# Patient Record
Sex: MALE | Race: BLACK OR AFRICAN AMERICAN | NOT HISPANIC OR LATINO | Employment: FULL TIME | URBAN - METROPOLITAN AREA
[De-identification: names, ages, dates, MRNs, and addresses within clinical notes are randomized per-mention and may not be internally consistent; named-entity substitution may affect disease eponyms.]

---

## 2017-09-25 ENCOUNTER — TELEPHONE (OUTPATIENT)
Dept: PRIMARY CARE CLINIC | Facility: CLINIC | Age: 52
End: 2017-09-25

## 2017-09-25 NOTE — TELEPHONE ENCOUNTER
----- Message from Shahab Hu sent at 9/22/2017 12:10 PM CDT -----  Contact: Patient  Patient states that he had called previously and left a message.  He was at the ER yesterday and has a bad Ulcer on the Right Side of his Thigh.  He made the appointment for Monday, 09/25/2017.  He is now outside the building but it was explained that it is no longer a walk in.  Please call him back at [phone].  Thank you

## 2021-10-27 ENCOUNTER — TELEPHONE (OUTPATIENT)
Dept: PRIMARY CARE CLINIC | Facility: CLINIC | Age: 56
End: 2021-10-27
Payer: COMMERCIAL

## 2021-11-09 ENCOUNTER — OFFICE VISIT (OUTPATIENT)
Dept: PRIMARY CARE CLINIC | Facility: CLINIC | Age: 56
End: 2021-11-09
Payer: COMMERCIAL

## 2021-11-09 VITALS
DIASTOLIC BLOOD PRESSURE: 86 MMHG | HEART RATE: 94 BPM | RESPIRATION RATE: 18 BRPM | BODY MASS INDEX: 40.71 KG/M2 | TEMPERATURE: 98 F | WEIGHT: 253.31 LBS | OXYGEN SATURATION: 99 % | HEIGHT: 66 IN | SYSTOLIC BLOOD PRESSURE: 136 MMHG

## 2021-11-09 DIAGNOSIS — E66.01 OBESITY, MORBID, BMI 40.0-49.9: ICD-10-CM

## 2021-11-09 DIAGNOSIS — Z76.89 ENCOUNTER TO ESTABLISH CARE: Primary | ICD-10-CM

## 2021-11-09 DIAGNOSIS — R60.0 LOWER EXTREMITY EDEMA: ICD-10-CM

## 2021-11-09 DIAGNOSIS — Z12.11 COLON CANCER SCREENING: ICD-10-CM

## 2021-11-09 DIAGNOSIS — Z00.00 HEALTH CARE MAINTENANCE: ICD-10-CM

## 2021-11-09 DIAGNOSIS — Z11.59 NEED FOR HEPATITIS C SCREENING TEST: ICD-10-CM

## 2021-11-09 DIAGNOSIS — Z23 NEED FOR VACCINATION: ICD-10-CM

## 2021-11-09 DIAGNOSIS — Z11.4 ENCOUNTER FOR SCREENING FOR HIV: ICD-10-CM

## 2021-11-09 PROCEDURE — 90714 TD VACCINE GREATER THAN OR EQUAL TO 7YO PRESERVATIVE FREE IM: ICD-10-PCS | Mod: S$GLB,,, | Performed by: STUDENT IN AN ORGANIZED HEALTH CARE EDUCATION/TRAINING PROGRAM

## 2021-11-09 PROCEDURE — 93010 ELECTROCARDIOGRAM REPORT: CPT | Mod: S$GLB,,, | Performed by: INTERNAL MEDICINE

## 2021-11-09 PROCEDURE — 1159F MED LIST DOCD IN RCRD: CPT | Mod: CPTII,S$GLB,, | Performed by: STUDENT IN AN ORGANIZED HEALTH CARE EDUCATION/TRAINING PROGRAM

## 2021-11-09 PROCEDURE — 93005 ELECTROCARDIOGRAM TRACING: CPT | Mod: S$GLB,,, | Performed by: STUDENT IN AN ORGANIZED HEALTH CARE EDUCATION/TRAINING PROGRAM

## 2021-11-09 PROCEDURE — 90714 TD VACC NO PRESV 7 YRS+ IM: CPT | Mod: S$GLB,,, | Performed by: STUDENT IN AN ORGANIZED HEALTH CARE EDUCATION/TRAINING PROGRAM

## 2021-11-09 PROCEDURE — 99204 OFFICE O/P NEW MOD 45 MIN: CPT | Mod: 25,S$GLB,, | Performed by: STUDENT IN AN ORGANIZED HEALTH CARE EDUCATION/TRAINING PROGRAM

## 2021-11-09 PROCEDURE — 3008F PR BODY MASS INDEX (BMI) DOCUMENTED: ICD-10-PCS | Mod: CPTII,S$GLB,, | Performed by: STUDENT IN AN ORGANIZED HEALTH CARE EDUCATION/TRAINING PROGRAM

## 2021-11-09 PROCEDURE — 3079F PR MOST RECENT DIASTOLIC BLOOD PRESSURE 80-89 MM HG: ICD-10-PCS | Mod: CPTII,S$GLB,, | Performed by: STUDENT IN AN ORGANIZED HEALTH CARE EDUCATION/TRAINING PROGRAM

## 2021-11-09 PROCEDURE — 1160F RVW MEDS BY RX/DR IN RCRD: CPT | Mod: CPTII,S$GLB,, | Performed by: STUDENT IN AN ORGANIZED HEALTH CARE EDUCATION/TRAINING PROGRAM

## 2021-11-09 PROCEDURE — 90471 TD VACCINE GREATER THAN OR EQUAL TO 7YO PRESERVATIVE FREE IM: ICD-10-PCS | Mod: S$GLB,,, | Performed by: STUDENT IN AN ORGANIZED HEALTH CARE EDUCATION/TRAINING PROGRAM

## 2021-11-09 PROCEDURE — 99204 PR OFFICE/OUTPT VISIT, NEW, LEVL IV, 45-59 MIN: ICD-10-PCS | Mod: 25,S$GLB,, | Performed by: STUDENT IN AN ORGANIZED HEALTH CARE EDUCATION/TRAINING PROGRAM

## 2021-11-09 PROCEDURE — 3075F SYST BP GE 130 - 139MM HG: CPT | Mod: CPTII,S$GLB,, | Performed by: STUDENT IN AN ORGANIZED HEALTH CARE EDUCATION/TRAINING PROGRAM

## 2021-11-09 PROCEDURE — 99999 PR PBB SHADOW E&M-EST. PATIENT-LVL III: ICD-10-PCS | Mod: PBBFAC,,, | Performed by: STUDENT IN AN ORGANIZED HEALTH CARE EDUCATION/TRAINING PROGRAM

## 2021-11-09 PROCEDURE — 3075F PR MOST RECENT SYSTOLIC BLOOD PRESS GE 130-139MM HG: ICD-10-PCS | Mod: CPTII,S$GLB,, | Performed by: STUDENT IN AN ORGANIZED HEALTH CARE EDUCATION/TRAINING PROGRAM

## 2021-11-09 PROCEDURE — 1160F PR REVIEW ALL MEDS BY PRESCRIBER/CLIN PHARMACIST DOCUMENTED: ICD-10-PCS | Mod: CPTII,S$GLB,, | Performed by: STUDENT IN AN ORGANIZED HEALTH CARE EDUCATION/TRAINING PROGRAM

## 2021-11-09 PROCEDURE — 99999 PR PBB SHADOW E&M-EST. PATIENT-LVL III: CPT | Mod: PBBFAC,,, | Performed by: STUDENT IN AN ORGANIZED HEALTH CARE EDUCATION/TRAINING PROGRAM

## 2021-11-09 PROCEDURE — 93005 EKG 12-LEAD: ICD-10-PCS | Mod: S$GLB,,, | Performed by: STUDENT IN AN ORGANIZED HEALTH CARE EDUCATION/TRAINING PROGRAM

## 2021-11-09 PROCEDURE — 93010 EKG 12-LEAD: ICD-10-PCS | Mod: S$GLB,,, | Performed by: INTERNAL MEDICINE

## 2021-11-09 PROCEDURE — 1159F PR MEDICATION LIST DOCUMENTED IN MEDICAL RECORD: ICD-10-PCS | Mod: CPTII,S$GLB,, | Performed by: STUDENT IN AN ORGANIZED HEALTH CARE EDUCATION/TRAINING PROGRAM

## 2021-11-09 PROCEDURE — 3079F DIAST BP 80-89 MM HG: CPT | Mod: CPTII,S$GLB,, | Performed by: STUDENT IN AN ORGANIZED HEALTH CARE EDUCATION/TRAINING PROGRAM

## 2021-11-09 PROCEDURE — 3008F BODY MASS INDEX DOCD: CPT | Mod: CPTII,S$GLB,, | Performed by: STUDENT IN AN ORGANIZED HEALTH CARE EDUCATION/TRAINING PROGRAM

## 2021-11-09 PROCEDURE — 90471 IMMUNIZATION ADMIN: CPT | Mod: S$GLB,,, | Performed by: STUDENT IN AN ORGANIZED HEALTH CARE EDUCATION/TRAINING PROGRAM

## 2021-12-03 LAB — NONINV COLON CA DNA+OCC BLD SCRN STL QL: NEGATIVE

## 2022-01-10 ENCOUNTER — OFFICE VISIT (OUTPATIENT)
Dept: PRIMARY CARE CLINIC | Facility: CLINIC | Age: 57
End: 2022-01-10
Payer: COMMERCIAL

## 2022-01-10 VITALS
BODY MASS INDEX: 42.07 KG/M2 | WEIGHT: 261.81 LBS | RESPIRATION RATE: 16 BRPM | SYSTOLIC BLOOD PRESSURE: 118 MMHG | HEART RATE: 81 BPM | DIASTOLIC BLOOD PRESSURE: 70 MMHG | OXYGEN SATURATION: 99 % | HEIGHT: 66 IN | TEMPERATURE: 98 F

## 2022-01-10 DIAGNOSIS — Z09 FOLLOW-UP EXAM: Primary | ICD-10-CM

## 2022-01-10 DIAGNOSIS — E78.5 HYPERLIPIDEMIA, UNSPECIFIED HYPERLIPIDEMIA TYPE: ICD-10-CM

## 2022-01-10 PROCEDURE — 3074F SYST BP LT 130 MM HG: CPT | Mod: CPTII,S$GLB,, | Performed by: STUDENT IN AN ORGANIZED HEALTH CARE EDUCATION/TRAINING PROGRAM

## 2022-01-10 PROCEDURE — 1159F PR MEDICATION LIST DOCUMENTED IN MEDICAL RECORD: ICD-10-PCS | Mod: CPTII,S$GLB,, | Performed by: STUDENT IN AN ORGANIZED HEALTH CARE EDUCATION/TRAINING PROGRAM

## 2022-01-10 PROCEDURE — 99214 PR OFFICE/OUTPT VISIT, EST, LEVL IV, 30-39 MIN: ICD-10-PCS | Mod: S$GLB,,, | Performed by: STUDENT IN AN ORGANIZED HEALTH CARE EDUCATION/TRAINING PROGRAM

## 2022-01-10 PROCEDURE — 3078F PR MOST RECENT DIASTOLIC BLOOD PRESSURE < 80 MM HG: ICD-10-PCS | Mod: CPTII,S$GLB,, | Performed by: STUDENT IN AN ORGANIZED HEALTH CARE EDUCATION/TRAINING PROGRAM

## 2022-01-10 PROCEDURE — 3008F PR BODY MASS INDEX (BMI) DOCUMENTED: ICD-10-PCS | Mod: CPTII,S$GLB,, | Performed by: STUDENT IN AN ORGANIZED HEALTH CARE EDUCATION/TRAINING PROGRAM

## 2022-01-10 PROCEDURE — 3008F BODY MASS INDEX DOCD: CPT | Mod: CPTII,S$GLB,, | Performed by: STUDENT IN AN ORGANIZED HEALTH CARE EDUCATION/TRAINING PROGRAM

## 2022-01-10 PROCEDURE — 99214 OFFICE O/P EST MOD 30 MIN: CPT | Mod: S$GLB,,, | Performed by: STUDENT IN AN ORGANIZED HEALTH CARE EDUCATION/TRAINING PROGRAM

## 2022-01-10 PROCEDURE — 3078F DIAST BP <80 MM HG: CPT | Mod: CPTII,S$GLB,, | Performed by: STUDENT IN AN ORGANIZED HEALTH CARE EDUCATION/TRAINING PROGRAM

## 2022-01-10 PROCEDURE — 99999 PR PBB SHADOW E&M-EST. PATIENT-LVL III: CPT | Mod: PBBFAC,,, | Performed by: STUDENT IN AN ORGANIZED HEALTH CARE EDUCATION/TRAINING PROGRAM

## 2022-01-10 PROCEDURE — 1160F RVW MEDS BY RX/DR IN RCRD: CPT | Mod: CPTII,S$GLB,, | Performed by: STUDENT IN AN ORGANIZED HEALTH CARE EDUCATION/TRAINING PROGRAM

## 2022-01-10 PROCEDURE — 1160F PR REVIEW ALL MEDS BY PRESCRIBER/CLIN PHARMACIST DOCUMENTED: ICD-10-PCS | Mod: CPTII,S$GLB,, | Performed by: STUDENT IN AN ORGANIZED HEALTH CARE EDUCATION/TRAINING PROGRAM

## 2022-01-10 PROCEDURE — 99999 PR PBB SHADOW E&M-EST. PATIENT-LVL III: ICD-10-PCS | Mod: PBBFAC,,, | Performed by: STUDENT IN AN ORGANIZED HEALTH CARE EDUCATION/TRAINING PROGRAM

## 2022-01-10 PROCEDURE — 1159F MED LIST DOCD IN RCRD: CPT | Mod: CPTII,S$GLB,, | Performed by: STUDENT IN AN ORGANIZED HEALTH CARE EDUCATION/TRAINING PROGRAM

## 2022-01-10 PROCEDURE — 3074F PR MOST RECENT SYSTOLIC BLOOD PRESSURE < 130 MM HG: ICD-10-PCS | Mod: CPTII,S$GLB,, | Performed by: STUDENT IN AN ORGANIZED HEALTH CARE EDUCATION/TRAINING PROGRAM

## 2022-01-10 RX ORDER — IBUPROFEN 800 MG/1
800 TABLET ORAL EVERY 6 HOURS PRN
COMMUNITY
End: 2023-07-11

## 2022-01-10 NOTE — PATIENT INSTRUCTIONS
"    Follow-up blood pressure:   - blood pressure normalized since adjusting diet.   - continue with the new diet.  Limit alcohol intake.  He lots of green vegetables, limit salt and avoid fried foods.    Elevated cholesterol:   - LDLs were slightly elevated in the 150s on last check, goal would be under 130.   - would recommend similar diet to that noted above with lots of vegetables, low fried foods.      Leg Swelling:   - bilateral.   - can use compression socks.  Elevation.  Regular exercise.   - will benefit from weight loss taking pressure off your circulatory system.    Elevated weight:   - encouraged to continue working on weight loss.   - your dietary changes have been encouraging.    Weight Loss:   - Body mass index is 42.26 kg/m².   - Normal weight is BMI 18-25, Overweight 25-30, and Obesity is 30+.   - would recommend weight loss for improved overall health.   - recommended moderate weight change, 1-2lbs per weeks.   - focus on eating a healthy sustainable diet.  Use food diary.   - consider jackie such as "Lose It" or "Noom".   - avoid empty calories that you may use daily from items such as like soda, sweet tea, sugary coffee, ice cream or candy.  An occasional piece of birthday cake is not the cause of obesity, but a daily Frappaccino could be to blame.    - Exercise has many benefits (heart health, improved mood/energy, higher self esteem, less depression, greater strength/flexibility, better sleep, less stress/anxiety, improved immune system, stronger bones, improved cognition, fewer colds/asthma exacerbations), it also does help lose weight.  But weight loss from exercise is much less impactful than when a change in diet can achieve.  Exercise is highly encouraged, but diet change should be the primary tool used to lose weight.     "

## 2022-01-10 NOTE — PROGRESS NOTES
Subjective:           Patient ID: Pravin Acevedo is a 56 y.o. male who presents today with a chief complaint of f/u on BP and weight check.    Chief Complaint:   No chief complaint on file.      History of Present Illness:    55yo male presenting to check his weight and BP.    States he cut back on the drinking.  Has been eating more protein, steaming veggies and avoiding fried foods.     States he plans to start doing some exercise, is joining a gym.    Patient that he may have lost some weight, but reviewing it appears that he gained slight weight over the [place] holiday.  Will continue work on weight loss through dietary interventions as well as through exercise.    Patient denies chest pain.  Denies shortness of breath.  Denies blurry vision or headaches.    Patient does state that he has mild swelling to the lower legs bilaterally, left worse than right.  States at times can be worse but is minimal today.    Review of Systems   Constitutional: Negative.  Negative for chills, fever and unexpected weight change.   HENT: Negative.    Eyes: Negative.    Respiratory: Negative.  Negative for cough, choking, shortness of breath and wheezing.    Cardiovascular: Negative.  Negative for chest pain, palpitations and leg swelling.   Gastrointestinal: Negative.  Negative for abdominal distention, constipation, diarrhea, nausea and vomiting.   Endocrine: Negative.    Genitourinary: Negative.  Negative for difficulty urinating, frequency and urgency.   Musculoskeletal: Positive for myalgias (at night at times with work). Negative for arthralgias.   Skin: Negative.  Negative for rash.   Allergic/Immunologic: Negative for food allergies.   Neurological: Negative for dizziness, weakness, numbness and headaches.   Psychiatric/Behavioral: Negative.  Negative for agitation, decreased concentration, dysphoric mood and self-injury. The patient is not nervous/anxious.            Objective:        Vitals:    01/10/22 0911   BP:  "118/70   BP Location: Right arm   Patient Position: Sitting   BP Method: Large (Manual)   Pulse: 81   Resp: 16   Temp: 98.4 °F (36.9 °C)   TempSrc: Oral   SpO2: 99%   Weight: 118.8 kg (261 lb 12.7 oz)   Height: 5' 6" (1.676 m)       Body mass index is 42.26 kg/m².      Physical Exam  Constitutional:       Appearance: Normal appearance. He is not toxic-appearing.      Comments: As per BMI.   HENT:      Head: Normocephalic and atraumatic.      Right Ear: External ear normal.      Left Ear: External ear normal.      Nose: No congestion.      Mouth/Throat:      Mouth: Mucous membranes are moist.      Pharynx: Oropharynx is clear.   Eyes:      Extraocular Movements: Extraocular movements intact.      Conjunctiva/sclera: Conjunctivae normal.   Cardiovascular:      Rate and Rhythm: Normal rate and regular rhythm.      Heart sounds: No murmur heard.      Pulmonary:      Effort: Pulmonary effort is normal. No respiratory distress.      Breath sounds: No wheezing.   Abdominal:      General: Bowel sounds are normal.      Palpations: Abdomen is soft.   Musculoskeletal:         General: No swelling.      Cervical back: Normal range of motion.      Right lower leg: Edema present.      Left lower leg: Edema present.      Comments: Mild non-pitting edema.   Skin:     General: Skin is warm.      Capillary Refill: Capillary refill takes less than 2 seconds.      Coloration: Skin is not jaundiced.   Neurological:      General: No focal deficit present.      Mental Status: He is alert and oriented to person, place, and time.      Motor: No weakness.   Psychiatric:         Mood and Affect: Mood normal.             Lab Results   Component Value Date     11/10/2021    K 4.1 11/10/2021     11/10/2021    CO2 25 11/10/2021    BUN 10 11/10/2021    CREATININE 1.0 11/10/2021    ANIONGAP 10 11/10/2021     No results found for: HGBA1C  No results found for: BNP, BNPTRIAGEBLO    Lab Results   Component Value Date    WBC 4.62 11/10/2021 " "   HGB 14.4 11/10/2021    HCT 44.1 11/10/2021     11/10/2021    GRAN 49.0 11/10/2021     Lab Results   Component Value Date    CHOL 238 (H) 11/10/2021    HDL 45 11/10/2021    LDLCALC 154.0 11/10/2021    TRIG 195 (H) 11/10/2021          Current Outpatient Medications:     ibuprofen (ADVIL,MOTRIN) 800 MG tablet, Take 800 mg by mouth every 6 (six) hours as needed for Pain., Disp: , Rfl:      Outpatient Encounter Medications as of 1/10/2022   Medication Sig Dispense Refill    ibuprofen (ADVIL,MOTRIN) 800 MG tablet Take 800 mg by mouth every 6 (six) hours as needed for Pain.       No facility-administered encounter medications on file as of 1/10/2022.          Assessment:       1. Follow-up exam    2. Hyperlipidemia, unspecified hyperlipidemia type           Plan:       Follow-up exam    Hyperlipidemia, unspecified hyperlipidemia type  -     Lipid Panel; Future; Expected date: 06/06/2022       Follow-up blood pressure:   - blood pressure normalized since adjusting diet.   - continue with the new diet.  Limit alcohol intake.  He lots of green vegetables, limit salt and avoid fried foods.    Elevated cholesterol:   - LDLs were slightly elevated in the 150s on last check, goal would be under 130.   - would recommend similar diet to that noted above with lots of vegetables, low fried foods.      Leg Swelling:   - bilateral.   - can use compression socks.  Elevation.  Regular exercise.   - will benefit from weight loss taking pressure off your circulatory system.    Elevated weight:   - encouraged to continue working on weight loss.   - your dietary changes have been encouraging.    Weight Loss:   - Body mass index is 42.26 kg/m².   - Normal weight is BMI 18-25, Overweight 25-30, and Obesity is 30+.   - would recommend weight loss for improved overall health.   - recommended moderate weight change, 1-2lbs per weeks.   - focus on eating a healthy sustainable diet.  Use food diary.   - consider jackie such as "Lose It" or " ""Noom".   - avoid empty calories that you may use daily from items such as like soda, sweet tea, sugary coffee, ice cream or candy.  An occasional piece of birthday cake is not the cause of obesity, but a daily Frappaccino could be to blame.    - Exercise has many benefits (heart health, improved mood/energy, higher self esteem, less depression, greater strength/flexibility, better sleep, less stress/anxiety, improved immune system, stronger bones, improved cognition, fewer colds/asthma exacerbations), it also does help lose weight.  But weight loss from exercise is much less impactful than when a change in diet can achieve.  Exercise is highly encouraged, but diet change should be the primary tool used to lose weight.               "

## 2022-07-11 ENCOUNTER — OFFICE VISIT (OUTPATIENT)
Dept: PRIMARY CARE CLINIC | Facility: CLINIC | Age: 57
End: 2022-07-11
Payer: COMMERCIAL

## 2022-07-11 VITALS
OXYGEN SATURATION: 97 % | BODY MASS INDEX: 41.08 KG/M2 | HEIGHT: 66 IN | DIASTOLIC BLOOD PRESSURE: 76 MMHG | RESPIRATION RATE: 16 BRPM | WEIGHT: 255.63 LBS | TEMPERATURE: 98 F | HEART RATE: 99 BPM | SYSTOLIC BLOOD PRESSURE: 130 MMHG

## 2022-07-11 DIAGNOSIS — M48.02 FORAMINAL STENOSIS OF CERVICAL REGION: ICD-10-CM

## 2022-07-11 DIAGNOSIS — M54.50 CHRONIC MIDLINE LOW BACK PAIN WITHOUT SCIATICA: ICD-10-CM

## 2022-07-11 DIAGNOSIS — E66.01 OBESITY, MORBID, BMI 40.0-49.9: Primary | ICD-10-CM

## 2022-07-11 DIAGNOSIS — R74.02 ELEVATED LDH: ICD-10-CM

## 2022-07-11 DIAGNOSIS — G89.29 CHRONIC MIDLINE LOW BACK PAIN WITHOUT SCIATICA: ICD-10-CM

## 2022-07-11 PROCEDURE — 1160F RVW MEDS BY RX/DR IN RCRD: CPT | Mod: CPTII,S$GLB,, | Performed by: STUDENT IN AN ORGANIZED HEALTH CARE EDUCATION/TRAINING PROGRAM

## 2022-07-11 PROCEDURE — 3075F PR MOST RECENT SYSTOLIC BLOOD PRESS GE 130-139MM HG: ICD-10-PCS | Mod: CPTII,S$GLB,, | Performed by: STUDENT IN AN ORGANIZED HEALTH CARE EDUCATION/TRAINING PROGRAM

## 2022-07-11 PROCEDURE — 1160F PR REVIEW ALL MEDS BY PRESCRIBER/CLIN PHARMACIST DOCUMENTED: ICD-10-PCS | Mod: CPTII,S$GLB,, | Performed by: STUDENT IN AN ORGANIZED HEALTH CARE EDUCATION/TRAINING PROGRAM

## 2022-07-11 PROCEDURE — 3008F BODY MASS INDEX DOCD: CPT | Mod: CPTII,S$GLB,, | Performed by: STUDENT IN AN ORGANIZED HEALTH CARE EDUCATION/TRAINING PROGRAM

## 2022-07-11 PROCEDURE — 99213 PR OFFICE/OUTPT VISIT, EST, LEVL III, 20-29 MIN: ICD-10-PCS | Mod: S$GLB,,, | Performed by: STUDENT IN AN ORGANIZED HEALTH CARE EDUCATION/TRAINING PROGRAM

## 2022-07-11 PROCEDURE — 99999 PR PBB SHADOW E&M-EST. PATIENT-LVL IV: ICD-10-PCS | Mod: PBBFAC,,, | Performed by: STUDENT IN AN ORGANIZED HEALTH CARE EDUCATION/TRAINING PROGRAM

## 2022-07-11 PROCEDURE — 99213 OFFICE O/P EST LOW 20 MIN: CPT | Mod: S$GLB,,, | Performed by: STUDENT IN AN ORGANIZED HEALTH CARE EDUCATION/TRAINING PROGRAM

## 2022-07-11 PROCEDURE — 99999 PR PBB SHADOW E&M-EST. PATIENT-LVL IV: CPT | Mod: PBBFAC,,, | Performed by: STUDENT IN AN ORGANIZED HEALTH CARE EDUCATION/TRAINING PROGRAM

## 2022-07-11 PROCEDURE — 3008F PR BODY MASS INDEX (BMI) DOCUMENTED: ICD-10-PCS | Mod: CPTII,S$GLB,, | Performed by: STUDENT IN AN ORGANIZED HEALTH CARE EDUCATION/TRAINING PROGRAM

## 2022-07-11 PROCEDURE — 3075F SYST BP GE 130 - 139MM HG: CPT | Mod: CPTII,S$GLB,, | Performed by: STUDENT IN AN ORGANIZED HEALTH CARE EDUCATION/TRAINING PROGRAM

## 2022-07-11 PROCEDURE — 1159F PR MEDICATION LIST DOCUMENTED IN MEDICAL RECORD: ICD-10-PCS | Mod: CPTII,S$GLB,, | Performed by: STUDENT IN AN ORGANIZED HEALTH CARE EDUCATION/TRAINING PROGRAM

## 2022-07-11 PROCEDURE — 1159F MED LIST DOCD IN RCRD: CPT | Mod: CPTII,S$GLB,, | Performed by: STUDENT IN AN ORGANIZED HEALTH CARE EDUCATION/TRAINING PROGRAM

## 2022-07-11 PROCEDURE — 3078F PR MOST RECENT DIASTOLIC BLOOD PRESSURE < 80 MM HG: ICD-10-PCS | Mod: CPTII,S$GLB,, | Performed by: STUDENT IN AN ORGANIZED HEALTH CARE EDUCATION/TRAINING PROGRAM

## 2022-07-11 PROCEDURE — 3078F DIAST BP <80 MM HG: CPT | Mod: CPTII,S$GLB,, | Performed by: STUDENT IN AN ORGANIZED HEALTH CARE EDUCATION/TRAINING PROGRAM

## 2022-07-11 RX ORDER — FAMOTIDINE 40 MG/1
40 TABLET, FILM COATED ORAL EVERY MORNING
COMMUNITY
Start: 2022-06-15 | End: 2023-07-11

## 2022-07-11 RX ORDER — TRAMADOL HYDROCHLORIDE 50 MG/1
TABLET ORAL
COMMUNITY
Start: 2022-06-15 | End: 2023-07-11

## 2022-07-11 NOTE — PROGRESS NOTES
"Subjective:           Patient ID: Pravin Acevedo is a 57 y.o. male who presents today with a chief complaint of weight loss.    Chief Complaint:   Follow-up (Lipids & weight)      History of Present Illness:    56yo male f/u to day to discuss his weight loss and lipids.    Has been seeing [facility] [place], operated by [facility] at [address] [place] [address] for his back pain.  Has been having back pain and feels that his weight is causing this to be worse.    States that his is a  and wants to do things OTC as he does not want to fail a drug test for work.    Was at 261 in January and is at 255 today.     Diet:  States has stopped drinking cold drinks and moved to non-sweet tea and green tea.     Has also stopped drinking beer.     Has been eating more greens and bioled foods over fried.     Was having fluid on the legs, but that has resolved.      Review of Systems   Constitutional: Negative.  Negative for chills, diaphoresis and fever.   HENT: Negative.  Negative for congestion.    Eyes: Negative.    Respiratory: Negative.  Negative for cough, shortness of breath and wheezing.    Cardiovascular: Negative.  Negative for chest pain and palpitations.   Gastrointestinal: Negative.  Negative for abdominal distention, diarrhea, nausea and vomiting.   Endocrine: Negative.    Genitourinary: Negative.  Negative for difficulty urinating.   Musculoskeletal: Positive for back pain. Negative for gait problem and joint swelling.   Skin: Negative.    Allergic/Immunologic: Negative for food allergies.   Neurological: Negative for dizziness and numbness.   Psychiatric/Behavioral: Negative.  Negative for confusion.           Objective:        Vitals:    07/11/22 0846   BP: 130/76   BP Location: Right arm   Patient Position: Sitting   BP Method: Large (Manual)   Pulse: 99   Resp: 16   Temp: 98.3 °F (36.8 °C)   TempSrc: Oral   SpO2: 97%   Weight: 115.9 kg (255 lb 10 oz)   Height: 5' 6" (1.676 m)       Body mass index is 41.26 kg/m².      Physical " Exam  Vitals reviewed.   Constitutional:       General: He is not in acute distress.     Appearance: Normal appearance. He is not ill-appearing.      Comments: As per BMI.   HENT:      Head: Normocephalic and atraumatic.      Right Ear: External ear normal.      Left Ear: External ear normal.      Nose: Nose normal.   Eyes:      Extraocular Movements: Extraocular movements intact.      Conjunctiva/sclera: Conjunctivae normal.   Cardiovascular:      Rate and Rhythm: Normal rate and regular rhythm.      Pulses: Normal pulses.      Heart sounds: No murmur heard.  Pulmonary:      Effort: Pulmonary effort is normal. No respiratory distress.   Musculoskeletal:         General: No swelling or deformity.      Cervical back: Normal range of motion.   Neurological:      General: No focal deficit present.      Mental Status: He is alert and oriented to person, place, and time.      Gait: Gait normal.   Psychiatric:         Mood and Affect: Mood normal.             Lab Results   Component Value Date     11/10/2021    K 4.1 11/10/2021     11/10/2021    CO2 25 11/10/2021    BUN 10 11/10/2021    CREATININE 1.0 11/10/2021    ANIONGAP 10 11/10/2021     No results found for: HGBA1C  No results found for: BNP, BNPTRIAGEBLO    Lab Results   Component Value Date    WBC 4.62 11/10/2021    HGB 14.4 11/10/2021    HCT 44.1 11/10/2021     11/10/2021    GRAN 49.0 11/10/2021     Lab Results   Component Value Date    CHOL 238 (H) 11/10/2021    HDL 45 11/10/2021    LDLCALC 154.0 11/10/2021    TRIG 195 (H) 11/10/2021          Current Outpatient Medications:     famotidine (PEPCID) 40 MG tablet, Take 40 mg by mouth every morning., Disp: , Rfl:     ibuprofen (ADVIL,MOTRIN) 800 MG tablet, Take 800 mg by mouth every 6 (six) hours as needed for Pain., Disp: , Rfl:     traMADoL (ULTRAM) 50 mg tablet, SMARTSI Tablet(s) By Mouth Every 12 Hours PRN, Disp: , Rfl:      Outpatient Encounter Medications as of 2022   Medication  "Sig Dispense Refill    famotidine (PEPCID) 40 MG tablet Take 40 mg by mouth every morning.      ibuprofen (ADVIL,MOTRIN) 800 MG tablet Take 800 mg by mouth every 6 (six) hours as needed for Pain.      traMADoL (ULTRAM) 50 mg tablet SMARTSI Tablet(s) By Mouth Every 12 Hours PRN       No facility-administered encounter medications on file as of 2022.          Assessment:       1. Obesity, morbid, BMI 40.0-49.9    2. Chronic midline low back pain without sciatica    3. Foraminal stenosis of cervical region           Plan:       Obesity, morbid, BMI 40.0-49.9    Chronic midline low back pain without sciatica    Foraminal stenosis of cervical region           Back Pain:   - chronic, working with [facility] on this.    Weight Loss:   -  Discussed option of Contrave and related on avg patients lose about 12lbs, but costs $95/month, will instead continue to improve diet at this time.    - Body mass index is 41.26 kg/m².   - Normal weight is BMI 18-25, Overweight 25-30, and Obesity is 30+.   - would recommend weight loss for improved overall health.   - recommended moderate weight change, 1-2lbs per weeks.   - focus on eating a healthy sustainable diet.  Use food diary.   - consider jackie such as "Lose It" or "Noom".   - avoid empty calories that you may use daily from items such as like soda, sweet tea, sugary coffee, ice cream or candy.  An occasional piece of birthday cake is not the cause of obesity, but a daily Frappaccino could be to blame.    - Exercise has many benefits (heart health, improved mood/energy, higher self esteem, less depression, greater strength/flexibility, better sleep, less stress/anxiety, improved immune system, stronger bones, improved cognition, fewer colds/asthma exacerbations), it also does help lose weight.  But weight loss from exercise is much less impactful than when a change in diet can achieve.  Exercise is highly encouraged, but diet change should be the primary tool used to " lose weight.     Cholesterol:   - will gets fasting blood work to check if cholesterol meds should be considered.    Cervical Spine Stenosis:   - shown on MRI.  Not getting arm weakness, some numbness but only while sleeping.     - continue to monitor.     Lower Ext Swelling:   - cleared since last appt, no concerns.

## 2022-07-11 NOTE — LETTER
July 11, 2022      [facility] [address]  [facility] [address]  Phone: [phone]  Fax: [phone]       Patient: Pravin Acevedo   YOB: 1965  Date of Visit: 07/11/2022    To Whom It May Concern:    Cam Acevedo  was at [facility] on 07/11/2022. The patient may return to work/school on 07/12/2022 with no restrictions. If you have any questions or concerns, or if I can be of further assistance, please do not hesitate to contact me.    Sincerely,    Fanny Yoder MA

## 2022-07-11 NOTE — PATIENT INSTRUCTIONS
"  Back Pain:   - chronic, working with [facility] on this.    Weight Loss:   -  Discussed option of Contrave and related on avg patients lose about 12lbs, but costs $95/month, will instead continue to improve diet at this time.    - Body mass index is 41.26 kg/m².   - Normal weight is BMI 18-25, Overweight 25-30, and Obesity is 30+.   - would recommend weight loss for improved overall health.   - recommended moderate weight change, 1-2lbs per weeks.   - focus on eating a healthy sustainable diet.  Use food diary.   - consider jackie such as "Lose It" or "Noom".   - avoid empty calories that you may use daily from items such as like soda, sweet tea, sugary coffee, ice cream or candy.  An occasional piece of birthday cake is not the cause of obesity, but a daily Frappaccino could be to blame.    - Exercise has many benefits (heart health, improved mood/energy, higher self esteem, less depression, greater strength/flexibility, better sleep, less stress/anxiety, improved immune system, stronger bones, improved cognition, fewer colds/asthma exacerbations), it also does help lose weight.  But weight loss from exercise is much less impactful than when a change in diet can achieve.  Exercise is highly encouraged, but diet change should be the primary tool used to lose weight.     Cholesterol:   - will gets fasting blood work to check if cholesterol meds should be considered.    Cervical Spine Stenosis:   - shown on MRI.  Not getting arm weakness, some numbness but only while sleeping.     - continue to monitor.     Lower Ext Swelling:   - cleared since last appt, no concerns.   "

## 2022-07-19 ENCOUNTER — PATIENT MESSAGE (OUTPATIENT)
Dept: PRIMARY CARE CLINIC | Facility: CLINIC | Age: 57
End: 2022-07-19
Payer: COMMERCIAL

## 2022-08-06 ENCOUNTER — PATIENT MESSAGE (OUTPATIENT)
Dept: PRIMARY CARE CLINIC | Facility: CLINIC | Age: 57
End: 2022-08-06
Payer: COMMERCIAL

## 2023-01-11 ENCOUNTER — OFFICE VISIT (OUTPATIENT)
Dept: PRIMARY CARE CLINIC | Facility: CLINIC | Age: 58
End: 2023-01-11
Payer: COMMERCIAL

## 2023-01-11 VITALS
SYSTOLIC BLOOD PRESSURE: 120 MMHG | HEIGHT: 66 IN | WEIGHT: 271.38 LBS | BODY MASS INDEX: 43.62 KG/M2 | OXYGEN SATURATION: 95 % | HEART RATE: 84 BPM | TEMPERATURE: 98 F | DIASTOLIC BLOOD PRESSURE: 78 MMHG | RESPIRATION RATE: 16 BRPM

## 2023-01-11 DIAGNOSIS — R74.02 ELEVATED LDH: ICD-10-CM

## 2023-01-11 DIAGNOSIS — Z23 FLU VACCINE NEED: ICD-10-CM

## 2023-01-11 DIAGNOSIS — E66.01 OBESITY, MORBID, BMI 40.0-49.9: ICD-10-CM

## 2023-01-11 DIAGNOSIS — G89.29 CHRONIC MIDLINE LOW BACK PAIN WITHOUT SCIATICA: ICD-10-CM

## 2023-01-11 DIAGNOSIS — Z00.00 ANNUAL PHYSICAL EXAM: Primary | ICD-10-CM

## 2023-01-11 DIAGNOSIS — M54.50 CHRONIC MIDLINE LOW BACK PAIN WITHOUT SCIATICA: ICD-10-CM

## 2023-01-11 PROCEDURE — 99396 PREV VISIT EST AGE 40-64: CPT | Mod: 25,S$GLB,, | Performed by: STUDENT IN AN ORGANIZED HEALTH CARE EDUCATION/TRAINING PROGRAM

## 2023-01-11 PROCEDURE — 3078F DIAST BP <80 MM HG: CPT | Mod: CPTII,S$GLB,, | Performed by: STUDENT IN AN ORGANIZED HEALTH CARE EDUCATION/TRAINING PROGRAM

## 2023-01-11 PROCEDURE — 90471 FLU VACCINE (QUAD) GREATER THAN OR EQUAL TO 3YO PRESERVATIVE FREE IM: ICD-10-PCS | Mod: S$GLB,,, | Performed by: STUDENT IN AN ORGANIZED HEALTH CARE EDUCATION/TRAINING PROGRAM

## 2023-01-11 PROCEDURE — 1159F MED LIST DOCD IN RCRD: CPT | Mod: CPTII,S$GLB,, | Performed by: STUDENT IN AN ORGANIZED HEALTH CARE EDUCATION/TRAINING PROGRAM

## 2023-01-11 PROCEDURE — 90686 IIV4 VACC NO PRSV 0.5 ML IM: CPT | Mod: S$GLB,,, | Performed by: STUDENT IN AN ORGANIZED HEALTH CARE EDUCATION/TRAINING PROGRAM

## 2023-01-11 PROCEDURE — 99999 PR PBB SHADOW E&M-EST. PATIENT-LVL IV: CPT | Mod: PBBFAC,,, | Performed by: STUDENT IN AN ORGANIZED HEALTH CARE EDUCATION/TRAINING PROGRAM

## 2023-01-11 PROCEDURE — 3078F PR MOST RECENT DIASTOLIC BLOOD PRESSURE < 80 MM HG: ICD-10-PCS | Mod: CPTII,S$GLB,, | Performed by: STUDENT IN AN ORGANIZED HEALTH CARE EDUCATION/TRAINING PROGRAM

## 2023-01-11 PROCEDURE — 99999 PR PBB SHADOW E&M-EST. PATIENT-LVL IV: ICD-10-PCS | Mod: PBBFAC,,, | Performed by: STUDENT IN AN ORGANIZED HEALTH CARE EDUCATION/TRAINING PROGRAM

## 2023-01-11 PROCEDURE — 1159F PR MEDICATION LIST DOCUMENTED IN MEDICAL RECORD: ICD-10-PCS | Mod: CPTII,S$GLB,, | Performed by: STUDENT IN AN ORGANIZED HEALTH CARE EDUCATION/TRAINING PROGRAM

## 2023-01-11 PROCEDURE — 3074F SYST BP LT 130 MM HG: CPT | Mod: CPTII,S$GLB,, | Performed by: STUDENT IN AN ORGANIZED HEALTH CARE EDUCATION/TRAINING PROGRAM

## 2023-01-11 PROCEDURE — 90686 FLU VACCINE (QUAD) GREATER THAN OR EQUAL TO 3YO PRESERVATIVE FREE IM: ICD-10-PCS | Mod: S$GLB,,, | Performed by: STUDENT IN AN ORGANIZED HEALTH CARE EDUCATION/TRAINING PROGRAM

## 2023-01-11 PROCEDURE — 3074F PR MOST RECENT SYSTOLIC BLOOD PRESSURE < 130 MM HG: ICD-10-PCS | Mod: CPTII,S$GLB,, | Performed by: STUDENT IN AN ORGANIZED HEALTH CARE EDUCATION/TRAINING PROGRAM

## 2023-01-11 PROCEDURE — 90471 IMMUNIZATION ADMIN: CPT | Mod: S$GLB,,, | Performed by: STUDENT IN AN ORGANIZED HEALTH CARE EDUCATION/TRAINING PROGRAM

## 2023-01-11 PROCEDURE — 3008F PR BODY MASS INDEX (BMI) DOCUMENTED: ICD-10-PCS | Mod: CPTII,S$GLB,, | Performed by: STUDENT IN AN ORGANIZED HEALTH CARE EDUCATION/TRAINING PROGRAM

## 2023-01-11 PROCEDURE — 3008F BODY MASS INDEX DOCD: CPT | Mod: CPTII,S$GLB,, | Performed by: STUDENT IN AN ORGANIZED HEALTH CARE EDUCATION/TRAINING PROGRAM

## 2023-01-11 PROCEDURE — 99396 PR PREVENTIVE VISIT,EST,40-64: ICD-10-PCS | Mod: 25,S$GLB,, | Performed by: STUDENT IN AN ORGANIZED HEALTH CARE EDUCATION/TRAINING PROGRAM

## 2023-01-11 NOTE — PATIENT INSTRUCTIONS
"  Annual Exam:   - vitals stable today.   - working as  and states is not as active as he should be.  Has not been eating as well as could either.     Elevated blood glucose:   - patient has history of elevated blood glucose will be getting A1c as well as fasting labs.    Back Pain:   - states his back pain is intermittent and not bothering him today.    - continue to monitor.    Vaccine:   - patient getting flu shot today.    Weight Loss:   - Body mass index is 43.8 kg/m².   - Normal weight is BMI 18-25, Overweight 25-30, and Obesity is 30+.   - would recommend weight loss for improved overall health.   - recommended moderate weight change, 1-2lbs per weeks.   - focus on eating a healthy sustainable diet.  Use food diary.   - consider jackie such as "Lose It" or "Noom".   - avoid empty calories that you may use daily from items such as like soda, sweet tea, sugary coffee, ice cream or candy.  An occasional piece of birthday cake is not the cause of obesity, but a daily Frappaccino could be to blame.    - Exercise has many benefits (heart health, improved mood/energy, higher self esteem, less depression, greater strength/flexibility, better sleep, less stress/anxiety, improved immune system, stronger bones, improved cognition, fewer colds/asthma exacerbations), it also does help lose weight.  But weight loss from exercise is much less impactful than when a change in diet can achieve.  Exercise is highly encouraged, but diet change should be the primary tool used to lose weight.     "

## 2023-01-11 NOTE — LETTER
January 11, 2023      [facility] [address], [address]  [facility] [address]  Phone: [phone]  Fax: [phone]       Patient: Pravin Acevedo   YOB: 1965  Date of Visit: 01/11/2023    To Whom It May Concern:    Cam Acevedo  was at [facility] on 01/11/2023. The patient may return to work/school on 01/14/2023 with no restrictions. If you have any questions or concerns, or if I can be of further assistance, please do not hesitate to contact me.    Sincerely,    Fanny Yoder MA

## 2023-01-11 NOTE — PROGRESS NOTES
"Subjective:           Patient ID: Pravin Acevedo is a 57 y.o. male who presents today with a chief complaint of follow-up back pain, cholesterol and weight..    Chief Complaint:   Follow-up (Weight, back pain & cholesterol )      History of Present Illness:    57-year-old male presenting for follow-up on back pain, cholesterol and weight.    States that he has gained about 30lb since out last appt.    But admits that he has not been eating well or active.  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  Negative for chills, fatigue and fever.   HENT: Negative.  Negative for congestion, rhinorrhea, sinus pressure, sinus pain and sneezing.    Eyes: Negative.    Respiratory: Negative.  Negative for cough, shortness of breath and wheezing.    Cardiovascular: Negative.  Negative for chest pain, palpitations and leg swelling.   Gastrointestinal: Negative.  Negative for abdominal distention, constipation, diarrhea and vomiting.   Endocrine: Negative.    Genitourinary: Negative.  Negative for difficulty urinating and frequency.   Musculoskeletal: Negative.  Negative for back pain (not at this time, but has had previously.).   Skin: Negative.    Allergic/Immunologic: Negative for food allergies.   Neurological:  Negative for headaches.   Psychiatric/Behavioral: Negative.  Negative for sleep disturbance.          Objective:        Vitals:    01/11/23 0838   BP: 120/78   BP Location: Right arm   Patient Position: Sitting   BP Method: Medium (Manual)   Pulse: 84   Resp: 16   Temp: 97.9 °F (36.6 °C)   TempSrc: Temporal   SpO2: 95%   Weight: 123.1 kg (271 lb 6.2 oz)   Height: 5' 6" (1.676 m)       Body mass index is 43.8 kg/m².      Physical Exam  Vitals reviewed.   Constitutional:       General: He is not in acute distress.     Appearance: Normal appearance. He is obese. He is not ill-appearing.      Comments: As per BMI.   HENT:      Head: Normocephalic and atraumatic.      " Right Ear: External ear normal.      Left Ear: External ear normal.      Nose: Nose normal.   Eyes:      Extraocular Movements: Extraocular movements intact.      Conjunctiva/sclera: Conjunctivae normal.   Cardiovascular:      Rate and Rhythm: Normal rate and regular rhythm.      Pulses: Normal pulses.      Heart sounds: No murmur heard.  Pulmonary:      Effort: Pulmonary effort is normal. No respiratory distress.      Breath sounds: No wheezing.   Abdominal:      Tenderness: There is no right CVA tenderness or left CVA tenderness.   Musculoskeletal:         General: No swelling or deformity.      Cervical back: Normal range of motion.      Right lower leg: No edema.      Left lower leg: No edema.   Skin:     Capillary Refill: Capillary refill takes less than 2 seconds.   Neurological:      General: No focal deficit present.      Mental Status: He is alert and oriented to person, place, and time.      Gait: Gait normal.   Psychiatric:         Mood and Affect: Mood normal.           Lab Results   Component Value Date     11/10/2021    K 4.1 11/10/2021     11/10/2021    CO2 25 11/10/2021    BUN 10 11/10/2021    CREATININE 1.0 11/10/2021    ANIONGAP 10 11/10/2021     No results found for: HGBA1C  No results found for: BNP, BNPTRIAGEBLO    Lab Results   Component Value Date    WBC 4.62 11/10/2021    HGB 14.4 11/10/2021    HCT 44.1 11/10/2021     11/10/2021    GRAN 49.0 11/10/2021     Lab Results   Component Value Date    CHOL 236 (H) 07/18/2022    HDL 39 (L) 07/18/2022    LDLCALC 163.4 (H) 07/18/2022    TRIG 168 (H) 07/18/2022          Current Outpatient Medications:     atorvastatin (LIPITOR) 20 MG tablet, Take 1 tablet (20 mg total) by mouth once daily., Disp: 90 tablet, Rfl: 3    famotidine (PEPCID) 40 MG tablet, Take 40 mg by mouth every morning., Disp: , Rfl:     ibuprofen (ADVIL,MOTRIN) 800 MG tablet, Take 800 mg by mouth every 6 (six) hours as needed for Pain., Disp: , Rfl:     traMADoL  (ULTRAM) 50 mg tablet, SMARTSI Tablet(s) By Mouth Every 12 Hours PRN, Disp: , Rfl:      Outpatient Encounter Medications as of 2023   Medication Sig Dispense Refill    atorvastatin (LIPITOR) 20 MG tablet Take 1 tablet (20 mg total) by mouth once daily. 90 tablet 3    famotidine (PEPCID) 40 MG tablet Take 40 mg by mouth every morning.      ibuprofen (ADVIL,MOTRIN) 800 MG tablet Take 800 mg by mouth every 6 (six) hours as needed for Pain.      traMADoL (ULTRAM) 50 mg tablet SMARTSI Tablet(s) By Mouth Every 12 Hours PRN       No facility-administered encounter medications on file as of 2023.          Assessment:       1. Annual physical exam    2. Obesity, morbid, BMI 40.0-49.9    3. Elevated LDH    4. Chronic midline low back pain without sciatica    5. Flu vaccine need           Plan:       Annual physical exam  -     CBC Auto Differential; Future; Expected date: 2023  -     Comprehensive Metabolic Panel; Future; Expected date: 2023  -     Lipid Panel; Future; Expected date: 2023  -     TSH; Future; Expected date: 2023    Obesity, morbid, BMI 40.0-49.9  -     Hemoglobin A1C; Future; Expected date: 2023  -     CBC Auto Differential; Future; Expected date: 2023  -     Comprehensive Metabolic Panel; Future; Expected date: 2023  -     Lipid Panel; Future; Expected date: 2023  -     TSH; Future; Expected date: 2023    Elevated LDH  -     CBC Auto Differential; Future; Expected date: 2023  -     Comprehensive Metabolic Panel; Future; Expected date: 2023  -     Lipid Panel; Future; Expected date: 2023  -     TSH; Future; Expected date: 2023    Chronic midline low back pain without sciatica    Flu vaccine need  -     Influenza - Quadrivalent *Preferred* (6 months+) (PF)                 Annual Exam:   - vitals stable today.   - working as [employer] and states is not as active as he should be.  Has not been eating as well as could  "either.     Elevated blood glucose:   - patient has history of elevated blood glucose will be getting A1c as well as fasting labs.    Back Pain:   - states his back pain is intermittent and not bothering him today.    - continue to monitor.    Vaccine:   - patient getting flu shot today.    Weight Loss:   - Body mass index is 43.8 kg/m².   - Normal weight is BMI 18-25, Overweight 25-30, and Obesity is 30+.   - would recommend weight loss for improved overall health.   - recommended moderate weight change, 1-2lbs per weeks.   - focus on eating a healthy sustainable diet.  Use food diary.   - consider jackie such as "Lose It" or "Noom".   - avoid empty calories that you may use daily from items such as like soda, sweet tea, sugary coffee, ice cream or candy.  An occasional piece of birthday cake is not the cause of obesity, but a daily Frappaccino could be to blame.    - Exercise has many benefits (heart health, improved mood/energy, higher self esteem, less depression, greater strength/flexibility, better sleep, less stress/anxiety, improved immune system, stronger bones, improved cognition, fewer colds/asthma exacerbations), it also does help lose weight.  But weight loss from exercise is much less impactful than when a change in diet can achieve.  Exercise is highly encouraged, but diet change should be the primary tool used to lose weight.     "

## 2023-01-11 NOTE — PROGRESS NOTES
Verified pt ID using name and . NKDA. Administered Influenza in right deltoid per physician order using aseptic technique. Aspirated and no blood return noted. Pt tolerated well with no adverse reactions noted.

## 2023-07-11 ENCOUNTER — OFFICE VISIT (OUTPATIENT)
Dept: PRIMARY CARE CLINIC | Facility: CLINIC | Age: 58
End: 2023-07-11
Payer: COMMERCIAL

## 2023-07-11 VITALS
DIASTOLIC BLOOD PRESSURE: 68 MMHG | RESPIRATION RATE: 16 BRPM | BODY MASS INDEX: 41.42 KG/M2 | SYSTOLIC BLOOD PRESSURE: 130 MMHG | WEIGHT: 257.69 LBS | HEIGHT: 66 IN | TEMPERATURE: 98 F | OXYGEN SATURATION: 97 % | HEART RATE: 92 BPM

## 2023-07-11 DIAGNOSIS — R74.02 ELEVATED LDH: ICD-10-CM

## 2023-07-11 DIAGNOSIS — M48.02 FORAMINAL STENOSIS OF CERVICAL REGION: Primary | ICD-10-CM

## 2023-07-11 DIAGNOSIS — E66.01 OBESITY, MORBID, BMI 40.0-49.9: ICD-10-CM

## 2023-07-11 PROCEDURE — 99214 OFFICE O/P EST MOD 30 MIN: CPT | Mod: S$GLB,,, | Performed by: STUDENT IN AN ORGANIZED HEALTH CARE EDUCATION/TRAINING PROGRAM

## 2023-07-11 PROCEDURE — 3078F DIAST BP <80 MM HG: CPT | Mod: CPTII,S$GLB,, | Performed by: STUDENT IN AN ORGANIZED HEALTH CARE EDUCATION/TRAINING PROGRAM

## 2023-07-11 PROCEDURE — 3008F BODY MASS INDEX DOCD: CPT | Mod: CPTII,S$GLB,, | Performed by: STUDENT IN AN ORGANIZED HEALTH CARE EDUCATION/TRAINING PROGRAM

## 2023-07-11 PROCEDURE — 1159F MED LIST DOCD IN RCRD: CPT | Mod: CPTII,S$GLB,, | Performed by: STUDENT IN AN ORGANIZED HEALTH CARE EDUCATION/TRAINING PROGRAM

## 2023-07-11 PROCEDURE — 99999 PR PBB SHADOW E&M-EST. PATIENT-LVL IV: CPT | Mod: PBBFAC,,, | Performed by: STUDENT IN AN ORGANIZED HEALTH CARE EDUCATION/TRAINING PROGRAM

## 2023-07-11 PROCEDURE — 99214 PR OFFICE/OUTPT VISIT, EST, LEVL IV, 30-39 MIN: ICD-10-PCS | Mod: S$GLB,,, | Performed by: STUDENT IN AN ORGANIZED HEALTH CARE EDUCATION/TRAINING PROGRAM

## 2023-07-11 PROCEDURE — 1159F PR MEDICATION LIST DOCUMENTED IN MEDICAL RECORD: ICD-10-PCS | Mod: CPTII,S$GLB,, | Performed by: STUDENT IN AN ORGANIZED HEALTH CARE EDUCATION/TRAINING PROGRAM

## 2023-07-11 PROCEDURE — 3044F PR MOST RECENT HEMOGLOBIN A1C LEVEL <7.0%: ICD-10-PCS | Mod: CPTII,S$GLB,, | Performed by: STUDENT IN AN ORGANIZED HEALTH CARE EDUCATION/TRAINING PROGRAM

## 2023-07-11 PROCEDURE — 3008F PR BODY MASS INDEX (BMI) DOCUMENTED: ICD-10-PCS | Mod: CPTII,S$GLB,, | Performed by: STUDENT IN AN ORGANIZED HEALTH CARE EDUCATION/TRAINING PROGRAM

## 2023-07-11 PROCEDURE — 1160F PR REVIEW ALL MEDS BY PRESCRIBER/CLIN PHARMACIST DOCUMENTED: ICD-10-PCS | Mod: CPTII,S$GLB,, | Performed by: STUDENT IN AN ORGANIZED HEALTH CARE EDUCATION/TRAINING PROGRAM

## 2023-07-11 PROCEDURE — 3078F PR MOST RECENT DIASTOLIC BLOOD PRESSURE < 80 MM HG: ICD-10-PCS | Mod: CPTII,S$GLB,, | Performed by: STUDENT IN AN ORGANIZED HEALTH CARE EDUCATION/TRAINING PROGRAM

## 2023-07-11 PROCEDURE — 99999 PR PBB SHADOW E&M-EST. PATIENT-LVL IV: ICD-10-PCS | Mod: PBBFAC,,, | Performed by: STUDENT IN AN ORGANIZED HEALTH CARE EDUCATION/TRAINING PROGRAM

## 2023-07-11 PROCEDURE — 3044F HG A1C LEVEL LT 7.0%: CPT | Mod: CPTII,S$GLB,, | Performed by: STUDENT IN AN ORGANIZED HEALTH CARE EDUCATION/TRAINING PROGRAM

## 2023-07-11 PROCEDURE — 3075F PR MOST RECENT SYSTOLIC BLOOD PRESS GE 130-139MM HG: ICD-10-PCS | Mod: CPTII,S$GLB,, | Performed by: STUDENT IN AN ORGANIZED HEALTH CARE EDUCATION/TRAINING PROGRAM

## 2023-07-11 PROCEDURE — 3075F SYST BP GE 130 - 139MM HG: CPT | Mod: CPTII,S$GLB,, | Performed by: STUDENT IN AN ORGANIZED HEALTH CARE EDUCATION/TRAINING PROGRAM

## 2023-07-11 PROCEDURE — 1160F RVW MEDS BY RX/DR IN RCRD: CPT | Mod: CPTII,S$GLB,, | Performed by: STUDENT IN AN ORGANIZED HEALTH CARE EDUCATION/TRAINING PROGRAM

## 2023-07-11 RX ORDER — MELOXICAM 15 MG/1
15 TABLET ORAL DAILY
Qty: 30 TABLET | Refills: 5 | Status: SHIPPED | OUTPATIENT
Start: 2023-07-11 | End: 2023-10-11 | Stop reason: SDUPTHER

## 2023-07-11 RX ORDER — ATORVASTATIN CALCIUM 20 MG/1
20 TABLET, FILM COATED ORAL DAILY
Qty: 90 TABLET | Refills: 3 | Status: SHIPPED | OUTPATIENT
Start: 2023-07-11 | End: 2023-10-11 | Stop reason: SDUPTHER

## 2023-07-11 RX ORDER — GABAPENTIN 300 MG/1
300 CAPSULE ORAL 2 TIMES DAILY
Qty: 60 CAPSULE | Refills: 5 | Status: SHIPPED | OUTPATIENT
Start: 2023-07-11 | End: 2023-10-11 | Stop reason: SDUPTHER

## 2023-07-11 NOTE — PATIENT INSTRUCTIONS
Foraminal stenosis of cervical region  -     meloxicam (MOBIC) 15 MG tablet; Take 1 tablet (15 mg total) by mouth once daily.  Dispense: 30 tablet; Refill: 5  -     gabapentin (NEURONTIN) 300 MG capsule; Take 1 capsule (300 mg total) by mouth 2 (two) times daily.  Dispense: 60 capsule; Refill: 5   - patient with some persistent shoulder and neck pain.  Had tried physical therapy without adequate relief previously.   - states baseline pain level is about 7/10 on a daily basis.   - using Tylenol 325 about 6 times a day with moderate relief.   - starting on gabapentin 300 mg 2 times a day, can increase to 3 times a day if needed.   - meloxicam 15 mg once a day with breakfast or with dinner.      Obesity, morbid, BMI 40.0-49.9  -     atorvastatin (LIPITOR) 20 MG tablet; Take 1 tablet (20 mg total) by mouth once daily.  Dispense: 90 tablet; Refill: 3  -     meloxicam (MOBIC) 15 MG tablet; Take 1 tablet (15 mg total) by mouth once daily.  Dispense: 30 tablet; Refill: 5  -     gabapentin (NEURONTIN) 300 MG capsule; Take 1 capsule (300 mg total) by mouth 2 (two) times daily.  Dispense: 60 capsule; Refill: 5   - improved diet, continue working on weight loss which can take stress off of you shoulder and spine to help reduce pain as well as will increase function through the day.    Elevated LDH  -     atorvastatin (LIPITOR) 20 MG tablet; Take 1 tablet (20 mg total) by mouth once daily.  Dispense: 90 tablet; Refill: 3

## 2023-07-11 NOTE — LETTER
July 11, 2023      [facility] [id number], [address]  [facility] [address]  Phone: [phone]  Fax: [phone]       Patient: Pravin Acevedo   YOB: 1965  Date of Visit: 07/11/2023    To Whom It May Concern:    Cam Acevedo  was at [facility] on 07/11/2023. The patient may return to work/school on 07/12/2023 with no restrictions. If you have any questions or concerns, or if I can be of further assistance, please do not hesitate to contact me.    Sincerely,    Fanny Yoder MA

## 2023-07-11 NOTE — PROGRESS NOTES
Subjective:           Patient ID: Pravin Acevedo is a 58 y.o. male who presents today with a chief complaint of follow-up weight.    Chief Complaint:   Follow-up (weight)      History of Present Illness:    58-year-old male presenting for follow-up on weight.    Patient weight in January was 271, weight today was 257, has lost approximately 14 lb in the last 6 months.    States he has stopped drinking beer, but is drinking 2 glasses of wine.    Had been seeing a provider at [facility], but is not anymore as was doing Tramdol from there for pain.      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  When resting at night and is controlled, but with increased activity makes it worse.  Uses tylenol.     Pain is located to the left shoulder as well as to the back in the cervical region.   Reports had MRI, but is not visible in our system currently.   States he has the disc as well as a printout showing it.     Has been using tylenol 325mg x about 6 daily.   Only taking Alleve once or twice a day but finds those don't last.     Also states he used Meloxicam before and seemed to help some.  Tried Gabapentin before.     Was doing PT before with the neck and shoulder, but felt that is was hurting more from PT than without it.   States moves the shoulder quite a bit with driving the truck all day.     Diet:   Has been avoiding starches.  Is eating more greens and some fruit.  Does not eat late in the day.  Drinks about 3 glasses of water in the evening before bed.  Not drinking sugary drinks.  Does some brewed tea (unsweet).    Drinks pickle juice at times as well.         Review of Systems   Constitutional: Negative.  Negative for chills, fatigue and fever.   HENT: Negative.  Negative for congestion, rhinorrhea, sinus pressure, sinus pain and sneezing.    Eyes: Negative.    Respiratory: Negative.  Negative for cough, shortness of breath and wheezing.    Cardiovascular: Negative.  Negative for chest pain,  "palpitations and leg swelling.   Gastrointestinal: Negative.  Negative for abdominal distention, constipation, diarrhea and vomiting.   Endocrine: Negative.    Genitourinary: Negative.  Negative for difficulty urinating and frequency.   Musculoskeletal:  Positive for arthralgias, back pain and joint swelling.   Skin: Negative.    Allergic/Immunologic: Negative for food allergies.   Neurological:  Negative for headaches.   Psychiatric/Behavioral: Negative.  Negative for sleep disturbance.          Objective:        Vitals:    07/11/23 0812   BP: 130/68   BP Location: Right arm   Patient Position: Sitting   BP Method: Medium (Manual)   Pulse: 92   Resp: 16   Temp: 97.8 °F (36.6 °C)   TempSrc: Temporal   SpO2: 97%   Weight: 116.9 kg (257 lb 11.5 oz)   Height: 5' 6" (1.676 m)       Body mass index is 41.6 kg/m².      Physical Exam  Vitals reviewed.   Constitutional:       General: He is not in acute distress.     Appearance: Normal appearance. He is obese. He is not ill-appearing.      Comments: As per BMI.   HENT:      Head: Normocephalic and atraumatic.      Right Ear: External ear normal.      Left Ear: External ear normal.      Nose: Nose normal.   Eyes:      Extraocular Movements: Extraocular movements intact.      Conjunctiva/sclera: Conjunctivae normal.   Cardiovascular:      Rate and Rhythm: Normal rate and regular rhythm.      Pulses: Normal pulses.      Heart sounds: No murmur heard.  Pulmonary:      Effort: Pulmonary effort is normal. No respiratory distress.      Breath sounds: No wheezing.   Abdominal:      Tenderness: There is no right CVA tenderness or left CVA tenderness.   Musculoskeletal:         General: Tenderness present. No swelling or deformity.      Cervical back: Normal range of motion.      Right lower leg: No edema.      Left lower leg: No edema.      Comments: Pain indicated to left shoulder as well as midline over cervical spine.   Skin:     Capillary Refill: Capillary refill takes less than " 2 seconds.   Neurological:      General: No focal deficit present.      Mental Status: He is alert and oriented to person, place, and time.      Gait: Gait normal.   Psychiatric:         Mood and Affect: Mood normal.           Lab Results   Component Value Date     01/11/2023    K 4.8 01/11/2023     01/11/2023    CO2 25 01/11/2023    BUN 12 01/11/2023    CREATININE 1.0 01/11/2023    ANIONGAP 9 01/11/2023     Lab Results   Component Value Date    HGBA1C 5.6 01/11/2023     No results found for: BNP, BNPTRIAGEBLO    Lab Results   Component Value Date    WBC 4.22 01/11/2023    HGB 13.8 (L) 01/11/2023    HCT 42.8 01/11/2023     01/11/2023    GRAN 2.4 01/11/2023    GRAN 57.6 01/11/2023     Lab Results   Component Value Date    CHOL 181 01/11/2023    HDL 40 01/11/2023    LDLCALC 114.6 01/11/2023    TRIG 132 01/11/2023          Current Outpatient Medications:     atorvastatin (LIPITOR) 20 MG tablet, Take 1 tablet (20 mg total) by mouth once daily., Disp: 90 tablet, Rfl: 3    gabapentin (NEURONTIN) 300 MG capsule, Take 1 capsule (300 mg total) by mouth 2 (two) times daily., Disp: 60 capsule, Rfl: 5    meloxicam (MOBIC) 15 MG tablet, Take 1 tablet (15 mg total) by mouth once daily., Disp: 30 tablet, Rfl: 5     Outpatient Encounter Medications as of 7/11/2023   Medication Sig Dispense Refill    atorvastatin (LIPITOR) 20 MG tablet Take 1 tablet (20 mg total) by mouth once daily. 90 tablet 3    gabapentin (NEURONTIN) 300 MG capsule Take 1 capsule (300 mg total) by mouth 2 (two) times daily. 60 capsule 5    meloxicam (MOBIC) 15 MG tablet Take 1 tablet (15 mg total) by mouth once daily. 30 tablet 5    [DISCONTINUED] atorvastatin (LIPITOR) 20 MG tablet Take 1 tablet (20 mg total) by mouth once daily. (Patient not taking: Reported on 7/11/2023) 90 tablet 3    [DISCONTINUED] famotidine (PEPCID) 40 MG tablet Take 40 mg by mouth every morning.      [DISCONTINUED] ibuprofen (ADVIL,MOTRIN) 800 MG tablet Take 800 mg by  mouth every 6 (six) hours as needed for Pain.      [DISCONTINUED] traMADoL (ULTRAM) 50 mg tablet SMARTSI Tablet(s) By Mouth Every 12 Hours PRN       No facility-administered encounter medications on file as of 2023.          Assessment:       1. Foraminal stenosis of cervical region    2. Obesity, morbid, BMI 40.0-49.9    3. Elevated LDH           Plan:       Foraminal stenosis of cervical region  -     meloxicam (MOBIC) 15 MG tablet; Take 1 tablet (15 mg total) by mouth once daily.  Dispense: 30 tablet; Refill: 5  -     gabapentin (NEURONTIN) 300 MG capsule; Take 1 capsule (300 mg total) by mouth 2 (two) times daily.  Dispense: 60 capsule; Refill: 5    Obesity, morbid, BMI 40.0-49.9  -     atorvastatin (LIPITOR) 20 MG tablet; Take 1 tablet (20 mg total) by mouth once daily.  Dispense: 90 tablet; Refill: 3  -     meloxicam (MOBIC) 15 MG tablet; Take 1 tablet (15 mg total) by mouth once daily.  Dispense: 30 tablet; Refill: 5  -     gabapentin (NEURONTIN) 300 MG capsule; Take 1 capsule (300 mg total) by mouth 2 (two) times daily.  Dispense: 60 capsule; Refill: 5    Elevated LDH  -     atorvastatin (LIPITOR) 20 MG tablet; Take 1 tablet (20 mg total) by mouth once daily.  Dispense: 90 tablet; Refill: 3               Foraminal stenosis of cervical region  -     meloxicam (MOBIC) 15 MG tablet; Take 1 tablet (15 mg total) by mouth once daily.  Dispense: 30 tablet; Refill: 5  -     gabapentin (NEURONTIN) 300 MG capsule; Take 1 capsule (300 mg total) by mouth 2 (two) times daily.  Dispense: 60 capsule; Refill: 5   - patient with some persistent shoulder and neck pain.  Had tried physical therapy without adequate relief previously.   - states baseline pain level is about 7/10 on a daily basis.   - using Tylenol 325 about 6 times a day with moderate relief.   - starting on gabapentin 300 mg 2 times a day, can increase to 3 times a day if needed.   - meloxicam 15 mg once a day with breakfast or with  dinner.      Obesity, morbid, BMI 40.0-49.9  -     atorvastatin (LIPITOR) 20 MG tablet; Take 1 tablet (20 mg total) by mouth once daily.  Dispense: 90 tablet; Refill: 3  -     meloxicam (MOBIC) 15 MG tablet; Take 1 tablet (15 mg total) by mouth once daily.  Dispense: 30 tablet; Refill: 5  -     gabapentin (NEURONTIN) 300 MG capsule; Take 1 capsule (300 mg total) by mouth 2 (two) times daily.  Dispense: 60 capsule; Refill: 5   - improved diet, continue working on weight loss which can take stress off of you shoulder and spine to help reduce pain as well as will increase function through the day.    Elevated LDH  -     atorvastatin (LIPITOR) 20 MG tablet; Take 1 tablet (20 mg total) by mouth once daily.  Dispense: 90 tablet; Refill: 3   - refilling cholesterol today.

## 2023-09-18 ENCOUNTER — PATIENT MESSAGE (OUTPATIENT)
Dept: PRIMARY CARE CLINIC | Facility: CLINIC | Age: 58
End: 2023-09-18
Payer: COMMERCIAL

## 2023-10-11 ENCOUNTER — OFFICE VISIT (OUTPATIENT)
Dept: PRIMARY CARE CLINIC | Facility: CLINIC | Age: 58
End: 2023-10-11
Payer: COMMERCIAL

## 2023-10-11 VITALS
WEIGHT: 262.69 LBS | SYSTOLIC BLOOD PRESSURE: 130 MMHG | HEIGHT: 66 IN | RESPIRATION RATE: 16 BRPM | DIASTOLIC BLOOD PRESSURE: 70 MMHG | TEMPERATURE: 98 F | OXYGEN SATURATION: 99 % | HEART RATE: 83 BPM | BODY MASS INDEX: 42.22 KG/M2

## 2023-10-11 DIAGNOSIS — M48.02 FORAMINAL STENOSIS OF CERVICAL REGION: ICD-10-CM

## 2023-10-11 DIAGNOSIS — G89.29 CHRONIC MIDLINE LOW BACK PAIN WITHOUT SCIATICA: ICD-10-CM

## 2023-10-11 DIAGNOSIS — R74.02 ELEVATED LDH: ICD-10-CM

## 2023-10-11 DIAGNOSIS — G89.29 CHRONIC LEFT SHOULDER PAIN: Primary | ICD-10-CM

## 2023-10-11 DIAGNOSIS — Z23 FLU VACCINE NEED: ICD-10-CM

## 2023-10-11 DIAGNOSIS — M25.512 CHRONIC LEFT SHOULDER PAIN: Primary | ICD-10-CM

## 2023-10-11 DIAGNOSIS — E66.01 OBESITY, MORBID, BMI 40.0-49.9: ICD-10-CM

## 2023-10-11 DIAGNOSIS — M54.50 CHRONIC MIDLINE LOW BACK PAIN WITHOUT SCIATICA: ICD-10-CM

## 2023-10-11 PROCEDURE — 99999 PR PBB SHADOW E&M-EST. PATIENT-LVL V: CPT | Mod: PBBFAC,,, | Performed by: STUDENT IN AN ORGANIZED HEALTH CARE EDUCATION/TRAINING PROGRAM

## 2023-10-11 PROCEDURE — 3075F PR MOST RECENT SYSTOLIC BLOOD PRESS GE 130-139MM HG: ICD-10-PCS | Mod: CPTII,S$GLB,, | Performed by: STUDENT IN AN ORGANIZED HEALTH CARE EDUCATION/TRAINING PROGRAM

## 2023-10-11 PROCEDURE — 99214 PR OFFICE/OUTPT VISIT, EST, LEVL IV, 30-39 MIN: ICD-10-PCS | Mod: 25,S$GLB,, | Performed by: STUDENT IN AN ORGANIZED HEALTH CARE EDUCATION/TRAINING PROGRAM

## 2023-10-11 PROCEDURE — 3075F SYST BP GE 130 - 139MM HG: CPT | Mod: CPTII,S$GLB,, | Performed by: STUDENT IN AN ORGANIZED HEALTH CARE EDUCATION/TRAINING PROGRAM

## 2023-10-11 PROCEDURE — 3078F DIAST BP <80 MM HG: CPT | Mod: CPTII,S$GLB,, | Performed by: STUDENT IN AN ORGANIZED HEALTH CARE EDUCATION/TRAINING PROGRAM

## 2023-10-11 PROCEDURE — 1160F PR REVIEW ALL MEDS BY PRESCRIBER/CLIN PHARMACIST DOCUMENTED: ICD-10-PCS | Mod: CPTII,S$GLB,, | Performed by: STUDENT IN AN ORGANIZED HEALTH CARE EDUCATION/TRAINING PROGRAM

## 2023-10-11 PROCEDURE — 1160F RVW MEDS BY RX/DR IN RCRD: CPT | Mod: CPTII,S$GLB,, | Performed by: STUDENT IN AN ORGANIZED HEALTH CARE EDUCATION/TRAINING PROGRAM

## 2023-10-11 PROCEDURE — 3008F PR BODY MASS INDEX (BMI) DOCUMENTED: ICD-10-PCS | Mod: CPTII,S$GLB,, | Performed by: STUDENT IN AN ORGANIZED HEALTH CARE EDUCATION/TRAINING PROGRAM

## 2023-10-11 PROCEDURE — 1159F PR MEDICATION LIST DOCUMENTED IN MEDICAL RECORD: ICD-10-PCS | Mod: CPTII,S$GLB,, | Performed by: STUDENT IN AN ORGANIZED HEALTH CARE EDUCATION/TRAINING PROGRAM

## 2023-10-11 PROCEDURE — 99999 PR PBB SHADOW E&M-EST. PATIENT-LVL V: ICD-10-PCS | Mod: PBBFAC,,, | Performed by: STUDENT IN AN ORGANIZED HEALTH CARE EDUCATION/TRAINING PROGRAM

## 2023-10-11 PROCEDURE — 3044F PR MOST RECENT HEMOGLOBIN A1C LEVEL <7.0%: ICD-10-PCS | Mod: CPTII,S$GLB,, | Performed by: STUDENT IN AN ORGANIZED HEALTH CARE EDUCATION/TRAINING PROGRAM

## 2023-10-11 PROCEDURE — 3044F HG A1C LEVEL LT 7.0%: CPT | Mod: CPTII,S$GLB,, | Performed by: STUDENT IN AN ORGANIZED HEALTH CARE EDUCATION/TRAINING PROGRAM

## 2023-10-11 PROCEDURE — 90471 FLU VACCINE (QUAD) GREATER THAN OR EQUAL TO 3YO PRESERVATIVE FREE IM: ICD-10-PCS | Mod: S$GLB,,, | Performed by: STUDENT IN AN ORGANIZED HEALTH CARE EDUCATION/TRAINING PROGRAM

## 2023-10-11 PROCEDURE — 90686 FLU VACCINE (QUAD) GREATER THAN OR EQUAL TO 3YO PRESERVATIVE FREE IM: ICD-10-PCS | Mod: S$GLB,,, | Performed by: STUDENT IN AN ORGANIZED HEALTH CARE EDUCATION/TRAINING PROGRAM

## 2023-10-11 PROCEDURE — 1159F MED LIST DOCD IN RCRD: CPT | Mod: CPTII,S$GLB,, | Performed by: STUDENT IN AN ORGANIZED HEALTH CARE EDUCATION/TRAINING PROGRAM

## 2023-10-11 PROCEDURE — 90686 IIV4 VACC NO PRSV 0.5 ML IM: CPT | Mod: S$GLB,,, | Performed by: STUDENT IN AN ORGANIZED HEALTH CARE EDUCATION/TRAINING PROGRAM

## 2023-10-11 PROCEDURE — 3008F BODY MASS INDEX DOCD: CPT | Mod: CPTII,S$GLB,, | Performed by: STUDENT IN AN ORGANIZED HEALTH CARE EDUCATION/TRAINING PROGRAM

## 2023-10-11 PROCEDURE — 99214 OFFICE O/P EST MOD 30 MIN: CPT | Mod: 25,S$GLB,, | Performed by: STUDENT IN AN ORGANIZED HEALTH CARE EDUCATION/TRAINING PROGRAM

## 2023-10-11 PROCEDURE — 3078F PR MOST RECENT DIASTOLIC BLOOD PRESSURE < 80 MM HG: ICD-10-PCS | Mod: CPTII,S$GLB,, | Performed by: STUDENT IN AN ORGANIZED HEALTH CARE EDUCATION/TRAINING PROGRAM

## 2023-10-11 PROCEDURE — 90471 IMMUNIZATION ADMIN: CPT | Mod: S$GLB,,, | Performed by: STUDENT IN AN ORGANIZED HEALTH CARE EDUCATION/TRAINING PROGRAM

## 2023-10-11 RX ORDER — MELOXICAM 15 MG/1
15 TABLET ORAL DAILY
Qty: 30 TABLET | Refills: 5 | Status: SHIPPED | OUTPATIENT
Start: 2023-11-01 | End: 2023-12-26 | Stop reason: SDUPTHER

## 2023-10-11 RX ORDER — ATORVASTATIN CALCIUM 20 MG/1
20 TABLET, FILM COATED ORAL DAILY
Qty: 90 TABLET | Refills: 3 | Status: SHIPPED | OUTPATIENT
Start: 2023-10-11 | End: 2023-12-26 | Stop reason: SDUPTHER

## 2023-10-11 RX ORDER — GABAPENTIN 300 MG/1
300 CAPSULE ORAL 2 TIMES DAILY
Qty: 60 CAPSULE | Refills: 11 | Status: SHIPPED | OUTPATIENT
Start: 2023-11-01 | End: 2023-11-16 | Stop reason: SDUPTHER

## 2023-10-11 NOTE — PATIENT INSTRUCTIONS
"  Left Shoulder pain:   - has left shoulder pain, chronic issue.   - has used TENs unit with some relief at home.   - uses NSAID with some relief, as well as gabapentin providing moderate relief.  But nothing fully eliminate pain.   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   - has done epidural injections spine before with 8 months of relief, advised to consider re-doing this procedure, but patient concern due to warning of overuse of steroids degrading joints.  Advised to discuss with chronic pain management provider.    Foraminal stenosis of cervical region   - having continued pain in the left shoulder, neck pain somewhat better.   - using Meloxicam daily and Gabapentin twice daily.       Elevated LDH  -     atorvastatin (LIPITOR) 20 MG tablet; Take 1 tablet (20 mg total) by mouth once daily.  Dispense: 90 tablet; Refill: 3   - cholesterol well controlled.  Continue statin.      Obesity, morbid, BMI 40.0-49.9  -     atorvastatin (LIPITOR) 20 MG tablet; Take 1 tablet (20 mg total)  by mouth once daily.  Dispense: 90 tablet; Refill: 3   - Body mass index is 42.4 kg/m².   - Normal weight is BMI 18-24.9.     - Overweight: 25-29.9  - Class 1 Obesity: 30-34.9  - Class 2 Obesity: 35-39.9  - Class 3 Obesity: 40+  - A BMI under 18 also shows diminished health outcomes.   - best health outcomes have been seen at a BMI of 22.    - excess weigh affects many body systems, including: cardiac, respiratory, GI, endocrine, musculoskeletal, dermatologic, reproductive, mental health and more.   - recommended moderate weight change, 1-2lbs per weeks.   - focus on eating a healthy sustainable diet.  Use food diary for at least a couple weeks to better understand what your diet.   - consider jackie such as "Lose It" or "Noom".   - avoid empty calories that you may use daily from items such as like soda, sweet tea, sugary coffee, ice cream, cake/pie, cookies/brownies/crackers or " "candy.  An occasional piece of birthday cake is not the cause of obesity, but a daily Frappaccino could be to blame.    - "Low Fat" on a box or bag should be eyed with caution, this fat it typically replaced with forms of sugar/carbs and the resultant product may be less healthy than other products.   - when possible eating whole foods is almost always preferable.  A diet of salads, green beans, broccoli, cauliflower, cucumbers, sweet potatoes, peppers, olives/avocados, tomatoes, beats, berries, eggs, meat/fish, shrimp/crawfish with some limited fruit (apples/oranges/bananas/grapes) and even more limited grains/starches (pasta/rice/bread/potatoes/cereal) will serve you much better in the long term than eating a bunch of diet bars, shakes or powders.     - Exercise has many benefits (heart health, improved mood/energy, higher self esteem, less depression, greater strength/flexibility, better sleep, less stress/anxiety, improved immune system, stronger bones, improved cognition, fewer colds/asthma exacerbations), it also does help lose weight.  But weight loss from exercise is much less impactful than when a change in diet can achieve.  Exercise is highly encouraged, but diet change should be the primary tool used to lose weight.       Flu vaccine need  -     Influenza - Quadrivalent *Preferred* (6 months+) (PF)      "

## 2023-10-11 NOTE — PROGRESS NOTES
Subjective:           Patient ID: Pravin Acevedo is a 58 y.o. male who presents today with a chief complaint of Follow-up (Neck pain & left shoulder)  .    Chief Complaint:   Follow-up (Neck pain & left shoulder)      History of Present Illness:    Pravin Acevedo is a 58 y.o. male who presents today with a chief complaint of Follow-up (Neck pain & left shoulder)  .    States that he is about to run out of his statin and would like a refill.    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  The PM dose does make his sleep a bit easier.    Is using Meloxicam in the AM, but in the evening the left shoulder starts to bother him.  Has been cutting back on pulling and tugging things and the neck has been doing better.    MRI from 3/22/2022 showed cervical foraminal stenosis.  Has does spinal injections on [address] before.  States that was helpful initially, but after 7-8 months the pains started to increase again.     States he is not interested in repeated injection.    Has a TENS machine that he was told can use up to 15 minutes at a time, uses at times to help the neck.    Review of Systems   Constitutional: Negative.  Negative for chills, fatigue and fever.   HENT: Negative.  Negative for congestion, rhinorrhea, sinus pressure, sinus pain and sneezing.    Eyes: Negative.    Respiratory: Negative.  Negative for cough, shortness of breath and wheezing.    Cardiovascular: Negative.  Negative for chest pain, palpitations and leg swelling.   Gastrointestinal: Negative.  Negative for abdominal distention, constipation, diarrhea and vomiting.   Endocrine: Negative.    Genitourinary: Negative.  Negative for difficulty urinating and frequency.   Musculoskeletal:  Positive for arthralgias, back pain and joint swelling.   Skin: Negative.    Allergic/Immunologic: Negative for food allergies.   Neurological:  Negative for headaches.   Psychiatric/Behavioral:  "Negative.  Negative for sleep disturbance.            Objective:        Vitals:    10/11/23 0753   BP: 130/70   BP Location: Right arm   Patient Position: Sitting   BP Method: Medium (Manual)   Pulse: 83   Resp: 16   Temp: 98.1 °F (36.7 °C)   TempSrc: Temporal   SpO2: 99%   Weight: 119.2 kg (262 lb 10.9 oz)   Height: 5' 6" (1.676 m)       Body mass index is 42.4 kg/m².      Physical Exam  Vitals reviewed.   Constitutional:       General: He is not in acute distress.     Appearance: Normal appearance. He is obese. He is not ill-appearing.      Comments: As per BMI.   HENT:      Head: Normocephalic and atraumatic.      Right Ear: External ear normal.      Left Ear: External ear normal.      Nose: Nose normal.   Eyes:      Extraocular Movements: Extraocular movements intact.      Conjunctiva/sclera: Conjunctivae normal.   Cardiovascular:      Rate and Rhythm: Normal rate and regular rhythm.      Pulses: Normal pulses.      Heart sounds: No murmur heard.  Pulmonary:      Effort: Pulmonary effort is normal. No respiratory distress.      Breath sounds: No wheezing.   Abdominal:      Tenderness: There is no right CVA tenderness or left CVA tenderness.   Musculoskeletal:         General: Tenderness present. No swelling or deformity.      Cervical back: Normal range of motion.      Right lower leg: No edema.      Left lower leg: No edema.      Comments: Pain indicated to left shoulder as well as midline over cervical spine.   Skin:     Capillary Refill: Capillary refill takes less than 2 seconds.   Neurological:      General: No focal deficit present.      Mental Status: He is alert and oriented to person, place, and time.      Gait: Gait normal.   Psychiatric:         Mood and Affect: Mood normal.             Lab Results   Component Value Date     01/11/2023    K 4.8 01/11/2023     01/11/2023    CO2 25 01/11/2023    BUN 12 01/11/2023    CREATININE 1.0 01/11/2023    ANIONGAP 9 01/11/2023     Lab Results " "  Component Value Date    HGBA1C 5.6 01/11/2023     No results found for: "BNP", "BNPTRIAGEBLO"    Lab Results   Component Value Date    WBC 4.22 01/11/2023    HGB 13.8 (L) 01/11/2023    HCT 42.8 01/11/2023     01/11/2023    GRAN 2.4 01/11/2023    GRAN 57.6 01/11/2023     Lab Results   Component Value Date    CHOL 181 01/11/2023    HDL 40 01/11/2023    LDLCALC 114.6 01/11/2023    TRIG 132 01/11/2023          Current Outpatient Medications:     gabapentin (NEURONTIN) 300 MG capsule, Take 1 capsule (300 mg total) by mouth 2 (two) times daily., Disp: 60 capsule, Rfl: 5    meloxicam (MOBIC) 15 MG tablet, Take 1 tablet (15 mg total) by mouth once daily., Disp: 30 tablet, Rfl: 5    atorvastatin (LIPITOR) 20 MG tablet, Take 1 tablet (20 mg total) by mouth once daily., Disp: 90 tablet, Rfl: 3     Outpatient Encounter Medications as of 10/11/2023   Medication Sig Dispense Refill    gabapentin (NEURONTIN) 300 MG capsule Take 1 capsule (300 mg total) by mouth 2 (two) times daily. 60 capsule 5    meloxicam (MOBIC) 15 MG tablet Take 1 tablet (15 mg total) by mouth once daily. 30 tablet 5    [DISCONTINUED] atorvastatin (LIPITOR) 20 MG tablet Take 1 tablet (20 mg total) by mouth once daily. 90 tablet 3    atorvastatin (LIPITOR) 20 MG tablet Take 1 tablet (20 mg total) by mouth once daily. 90 tablet 3     No facility-administered encounter medications on file as of 10/11/2023.          Assessment:       1. Chronic left shoulder pain    2. Foraminal stenosis of cervical region    3. Chronic midline low back pain without sciatica    4. Elevated LDH    5. Obesity, morbid, BMI 40.0-49.9    6. Flu vaccine need           Plan:       Chronic left shoulder pain    Foraminal stenosis of cervical region  -     Ambulatory referral/consult to Pain Clinic; Future; Expected date: 10/18/2023    Chronic midline low back pain without sciatica  -     Ambulatory referral/consult to Pain Clinic; Future; Expected date: 10/18/2023    Elevated " LDH  -     atorvastatin (LIPITOR) 20 MG tablet; Take 1 tablet (20 mg total) by mouth once daily.  Dispense: 90 tablet; Refill: 3    Obesity, morbid, BMI 40.0-49.9  -     atorvastatin (LIPITOR) 20 MG tablet; Take 1 tablet (20 mg total) by mouth once daily.  Dispense: 90 tablet; Refill: 3    Flu vaccine need  -     Influenza - Quadrivalent *Preferred* (6 months+) (PF)               Left Shoulder pain:   - has left shoulder pain, chronic issue.   - has used TENs unit with some relief at home.   - uses NSAID with some relief, as well as gabapentin providing moderate relief.  But nothing fully eliminate pain.   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   - has done epidural injections spine before with 8 months of relief, advised to consider re-doing this procedure, but patient concern due to warning of overuse of steroids degrading joints.  Advised to discuss with chronic pain management provider.    Foraminal stenosis of cervical region   - having continued pain in the left shoulder, neck pain somewhat better.   - using Meloxicam daily and Gabapentin twice daily.       Elevated LDH  -     atorvastatin (LIPITOR) 20 MG tablet; Take 1 tablet (20 mg total) by mouth once daily.  Dispense: 90 tablet; Refill: 3   - cholesterol well controlled.  Continue statin.      Obesity, morbid, BMI 40.0-49.9  -     atorvastatin (LIPITOR) 20 MG tablet; Take 1 tablet (20 mg total)  by mouth once daily.  Dispense: 90 tablet; Refill: 3   - Body mass index is 42.4 kg/m².   - Normal weight is BMI 18-24.9.     - Overweight: 25-29.9  - Class 1 Obesity: 30-34.9  - Class 2 Obesity: 35-39.9  - Class 3 Obesity: 40+  - A BMI under 18 also shows diminished health outcomes.   - best health outcomes have been seen at a BMI of 22.    - excess weigh affects many body systems, including: cardiac, respiratory, GI, endocrine, musculoskeletal, dermatologic, reproductive, mental health and more.   -  "recommended moderate weight change, 1-2lbs per weeks.   - focus on eating a healthy sustainable diet.  Use food diary for at least a couple weeks to better understand what your diet.   - consider jackie such as "Lose It" or "Noom".   - avoid empty calories that you may use daily from items such as like soda, sweet tea, sugary coffee, ice cream, cake/pie, cookies/brownies/crackers or candy.  An occasional piece of birthday cake is not the cause of obesity, but a daily Frappaccino could be to blame.    - "Low Fat" on a box or bag should be eyed with caution, this fat it typically replaced with forms of sugar/carbs and the resultant product may be less healthy than other products.   - when possible eating whole foods is almost always preferable.  A diet of salads, green beans, broccoli, cauliflower, cucumbers, sweet potatoes, peppers, olives/avocados, tomatoes, beats, berries, eggs, meat/fish, shrimp/crawfish with some limited fruit (apples/oranges/bananas/grapes) and even more limited grains/starches (pasta/rice/bread/potatoes/cereal) will serve you much better in the long term than eating a bunch of diet bars, shakes or powders.     - Exercise has many benefits (heart health, improved mood/energy, higher self esteem, less depression, greater strength/flexibility, better sleep, less stress/anxiety, improved immune system, stronger bones, improved cognition, fewer colds/asthma exacerbations), it also does help lose weight.  But weight loss from exercise is much less impactful than when a change in diet can achieve.  Exercise is highly encouraged, but diet change should be the primary tool used to lose weight.       Flu vaccine need  -     Influenza - Quadrivalent *Preferred* (6 months+) (PF)        "

## 2023-10-18 ENCOUNTER — PATIENT MESSAGE (OUTPATIENT)
Dept: CARDIOLOGY | Facility: CLINIC | Age: 58
End: 2023-10-18
Payer: COMMERCIAL

## 2023-11-16 ENCOUNTER — OFFICE VISIT (OUTPATIENT)
Dept: PAIN MEDICINE | Facility: CLINIC | Age: 58
End: 2023-11-16
Payer: COMMERCIAL

## 2023-11-16 VITALS
BODY MASS INDEX: 43.01 KG/M2 | HEIGHT: 66 IN | DIASTOLIC BLOOD PRESSURE: 78 MMHG | WEIGHT: 267.63 LBS | HEART RATE: 83 BPM | SYSTOLIC BLOOD PRESSURE: 138 MMHG

## 2023-11-16 DIAGNOSIS — M50.30 DDD (DEGENERATIVE DISC DISEASE), CERVICAL: ICD-10-CM

## 2023-11-16 DIAGNOSIS — M47.812 CERVICAL SPONDYLOSIS: ICD-10-CM

## 2023-11-16 DIAGNOSIS — G89.29 CHRONIC MIDLINE LOW BACK PAIN WITHOUT SCIATICA: ICD-10-CM

## 2023-11-16 DIAGNOSIS — E66.01 OBESITY, MORBID, BMI 40.0-49.9: ICD-10-CM

## 2023-11-16 DIAGNOSIS — M48.02 FORAMINAL STENOSIS OF CERVICAL REGION: Primary | ICD-10-CM

## 2023-11-16 DIAGNOSIS — M54.50 CHRONIC MIDLINE LOW BACK PAIN WITHOUT SCIATICA: ICD-10-CM

## 2023-11-16 DIAGNOSIS — M54.12 CERVICAL RADICULOPATHY: ICD-10-CM

## 2023-11-16 PROCEDURE — 1159F PR MEDICATION LIST DOCUMENTED IN MEDICAL RECORD: ICD-10-PCS | Mod: CPTII,S$GLB,, | Performed by: PAIN MEDICINE

## 2023-11-16 PROCEDURE — 99204 PR OFFICE/OUTPT VISIT, NEW, LEVL IV, 45-59 MIN: ICD-10-PCS | Mod: S$GLB,,, | Performed by: PAIN MEDICINE

## 2023-11-16 PROCEDURE — 3078F DIAST BP <80 MM HG: CPT | Mod: CPTII,S$GLB,, | Performed by: PAIN MEDICINE

## 2023-11-16 PROCEDURE — 3078F PR MOST RECENT DIASTOLIC BLOOD PRESSURE < 80 MM HG: ICD-10-PCS | Mod: CPTII,S$GLB,, | Performed by: PAIN MEDICINE

## 2023-11-16 PROCEDURE — 99204 OFFICE O/P NEW MOD 45 MIN: CPT | Mod: S$GLB,,, | Performed by: PAIN MEDICINE

## 2023-11-16 PROCEDURE — 3008F BODY MASS INDEX DOCD: CPT | Mod: CPTII,S$GLB,, | Performed by: PAIN MEDICINE

## 2023-11-16 PROCEDURE — 3075F SYST BP GE 130 - 139MM HG: CPT | Mod: CPTII,S$GLB,, | Performed by: PAIN MEDICINE

## 2023-11-16 PROCEDURE — 1160F PR REVIEW ALL MEDS BY PRESCRIBER/CLIN PHARMACIST DOCUMENTED: ICD-10-PCS | Mod: CPTII,S$GLB,, | Performed by: PAIN MEDICINE

## 2023-11-16 PROCEDURE — 1160F RVW MEDS BY RX/DR IN RCRD: CPT | Mod: CPTII,S$GLB,, | Performed by: PAIN MEDICINE

## 2023-11-16 PROCEDURE — 99999 PR PBB SHADOW E&M-EST. PATIENT-LVL III: CPT | Mod: PBBFAC,,, | Performed by: PAIN MEDICINE

## 2023-11-16 PROCEDURE — 3075F PR MOST RECENT SYSTOLIC BLOOD PRESS GE 130-139MM HG: ICD-10-PCS | Mod: CPTII,S$GLB,, | Performed by: PAIN MEDICINE

## 2023-11-16 PROCEDURE — 3044F HG A1C LEVEL LT 7.0%: CPT | Mod: CPTII,S$GLB,, | Performed by: PAIN MEDICINE

## 2023-11-16 PROCEDURE — 1159F MED LIST DOCD IN RCRD: CPT | Mod: CPTII,S$GLB,, | Performed by: PAIN MEDICINE

## 2023-11-16 PROCEDURE — 3008F PR BODY MASS INDEX (BMI) DOCUMENTED: ICD-10-PCS | Mod: CPTII,S$GLB,, | Performed by: PAIN MEDICINE

## 2023-11-16 PROCEDURE — 3044F PR MOST RECENT HEMOGLOBIN A1C LEVEL <7.0%: ICD-10-PCS | Mod: CPTII,S$GLB,, | Performed by: PAIN MEDICINE

## 2023-11-16 PROCEDURE — 99999 PR PBB SHADOW E&M-EST. PATIENT-LVL III: ICD-10-PCS | Mod: PBBFAC,,, | Performed by: PAIN MEDICINE

## 2023-11-16 RX ORDER — GABAPENTIN 300 MG/1
CAPSULE ORAL
Qty: 60 CAPSULE | Refills: 11 | Status: SHIPPED | OUTPATIENT
Start: 2023-11-16 | End: 2023-12-26 | Stop reason: SDUPTHER

## 2023-11-16 NOTE — PROGRESS NOTES
Subjective:     Patient ID: Pravin Acevedo is a 58 y.o. male    Chief Complaint: Neck Pain (Left side neck pain and shoulder pain on left)      Referred by: Clayton Esparza MD      HPI:    Initial Encounter (11/16/23):  Pravin Acevedo is a 58 y.o. male who presents today with chronic left-sided neck, upper back, left upper extremity pain/numbness.  This pain has been present for roughly 6 years.  No specific inciting events or injuries noted.  The pain is located the left lower cervical/upper thoracic paraspinal region radiates the left upper trapezius.  Pain will also extend into the left upper extremity consistent with a C6 dermatome associated paresthesias in the thumb and index finger.  Denies any focal weakness or bowel bladder dysfunction.  Pain is constant and worsened with activity.   This pain is described in detail below.    Physical Therapy:  Yes.  Completed physical therapy earlier this year and performs daily home exercise program.    Non-pharmacologic Treatment:  Rest helps         TENS?  No    Pain Medications:         Currently taking:  Gabapentin, meloxicam    Has tried in the past:  NSAIDs, Tylenol, tramadol    Has not tried:  Muscle relaxants, TCAs, SNRIs, anticonvulsants, topical creams    Blood thinners:  None    Interventional Therapies:    Patient reports 1 previous injection in his neck.  Specific details regarding this injection are not available.    Relevant Surgeries:  None    Affecting sleep?  Yes    Affecting daily activities? yes    Depressive symptoms? No          SI/HI? No    Work status: Employed    Pain Scores:    Best:       0/10  Worst:     10/10  Usually:   7/10  Today:    6/10    Pain Disability Index  Family/Home Responsibilities:: 6  Recreation:: 5  Social Activity:: 5  Occupation:: 5  Sexual Behavior:: 5  Self Care:: 7  Life-Support Activities:: 7  Pain Disability Index (PDI): 40    Review of Systems   Constitutional:  Negative for activity change, appetite change,  "chills, fatigue, fever and unexpected weight change.   HENT:  Negative for hearing loss.    Eyes:  Negative for visual disturbance.   Respiratory:  Negative for chest tightness and shortness of breath.    Cardiovascular:  Negative for chest pain.   Gastrointestinal:  Negative for abdominal pain, constipation, diarrhea, nausea and vomiting.   Genitourinary:  Negative for difficulty urinating.   Musculoskeletal:  Positive for myalgias, neck pain and neck stiffness. Negative for back pain and gait problem.   Skin:  Negative for rash.   Neurological:  Positive for numbness. Negative for dizziness, weakness, light-headedness and headaches.   Psychiatric/Behavioral:  Positive for sleep disturbance. Negative for hallucinations and suicidal ideas. The patient is not nervous/anxious.        History reviewed. No pertinent past medical history.    History reviewed. No pertinent surgical history.    Social History     Socioeconomic History    Marital status:    Tobacco Use    Smoking status: Never    Smokeless tobacco: Never   Substance and Sexual Activity    Alcohol use: Yes     Alcohol/week: 6.0 standard drinks of alcohol     Types: 6 Cans of beer per week     Comment: 6 beer per weekend    Drug use: Never    Sexual activity: Yes     Partners: Female       Review of patient's allergies indicates:  No Known Allergies    Current Outpatient Medications on File Prior to Visit   Medication Sig Dispense Refill    atorvastatin (LIPITOR) 20 MG tablet Take 1 tablet (20 mg total) by mouth once daily. 90 tablet 3    meloxicam (MOBIC) 15 MG tablet Take 1 tablet (15 mg total) by mouth once daily. 30 tablet 5    [DISCONTINUED] gabapentin (NEURONTIN) 300 MG capsule Take 1 capsule (300 mg total) by mouth 2 (two) times daily. 60 capsule 11     No current facility-administered medications on file prior to visit.       Objective:      /78   Pulse 83   Ht 5' 6" (1.676 m)   Wt 121.4 kg (267 lb 10.2 oz)   BMI 43.20 kg/m² "     Exam:  GEN:  Well developed, well nourished.  No acute distress.  Normal pain behavior.  HEENT:  No trauma.  Mucous membranes moist.  Nares patent bilaterally.  PSYCH: Normal affect. Thought content appropriate.  CHEST:  Breathing symmetric.  No audible wheezing.  ABD: Soft, non-distended.  SKIN:  Warm, pink, dry.  No rash on exposed areas.    EXT:  No cyanosis, clubbing, or edema.  No color change or changes in nail or hair growth.  NEURO/MUSCULOSKELETAL:  Fully alert, oriented, and appropriate. Speech normal jim. No cranial nerve deficits.   Gait:  Normal.  5/5 motor strength throughout upper extremities.   Sensory:  Decreased sensation light touch in the left C6 dermatome; otherwise no sensory deficit in the upper extremities.   Reflexes:  1 + and symmetric throughout.  Absent Talley's bilaterally.  C-Spine:  Full ROM with pain on left rotation.  Negative facet loading bilaterally.  Negative Spurling's bilaterally.    Positive TTP over left lower cervical paraspinal muscles, left upper trapezius      Imaging:    Cervical MRI report from March of 2022:    C3-4: Bilateral hypertrophic foraminal stenosis.  Probable bilateral C4 nerve root impingement.    C4-5: Right hypertrophic foraminal stenosis.  Probable right C5 nerve root impingement.  C5-6:  Bilateral hypertrophic foraminal stenosis.  Probable bilateral C6 nerve root impingement.  C6-7:  Central left lateral disc herniation.  Bilateral foraminal stenosis.  Probable bilateral C7 nerve root impingement.      Assessment:       Encounter Diagnoses   Name Primary?    Foraminal stenosis of cervical region Yes    Chronic midline low back pain without sciatica     Obesity, morbid, BMI 40.0-49.9     DDD (degenerative disc disease), cervical     Cervical radiculopathy     Cervical spondylosis        Plan:       Pravin was seen today for neck pain.    Diagnoses and all orders for this visit:    Foraminal stenosis of cervical region  -     Ambulatory  referral/consult to Pain Clinic  -     gabapentin (NEURONTIN) 300 MG capsule; Take 1 capsule (300 mg total) by mouth once daily AND 2 capsules (600 mg total) every evening.    Chronic midline low back pain without sciatica  -     Ambulatory referral/consult to Pain Clinic    Obesity, morbid, BMI 40.0-49.9  -     gabapentin (NEURONTIN) 300 MG capsule; Take 1 capsule (300 mg total) by mouth once daily AND 2 capsules (600 mg total) every evening.    DDD (degenerative disc disease), cervical    Cervical radiculopathy    Cervical spondylosis        Pravinjocy Acevedo is a 58 y.o. male with left-sided neck and upper extremity pain.  Most consistent with a left C6 radiculopathy.  Does have decreased sensation consistent with this diagnosis.  No overt weakness. Cervical MRI report does indicate multilevel degenerative disc disease/facet hypertrophy causing multilevel bilateral foraminal stenosis/nerve root impingement.    Pertinent imaging studies reviewed by me. Imaging results were discussed with patient.  Advised patient to bring imaging disc any follow-up visits that images can be directly reviewed.  Increase nighttime dose of gabapentin 600 mg.  Would consider increasing daytime doses well, but patient drives a truck for a living and it would be best to avoid any sedation while doing so.    Continue meloxicam.    We discussed performing an epidural steroid injection.  Patient states that he is not quite interested in this option at this time.  He will consider in the future if pain worsens.  Return to clinic as needed.            This note was created by combination of typed  and M-Modal dictation. Transcription and phonetic errors may be present.  If there are any questions, please contact me.

## 2023-12-26 DIAGNOSIS — R74.02 ELEVATED LDH: ICD-10-CM

## 2023-12-26 DIAGNOSIS — E66.01 OBESITY, MORBID, BMI 40.0-49.9: ICD-10-CM

## 2023-12-26 DIAGNOSIS — M48.02 FORAMINAL STENOSIS OF CERVICAL REGION: ICD-10-CM

## 2023-12-26 RX ORDER — MELOXICAM 15 MG/1
15 TABLET ORAL DAILY
Qty: 30 TABLET | Refills: 5 | Status: SHIPPED | OUTPATIENT
Start: 2023-12-26

## 2023-12-26 RX ORDER — ATORVASTATIN CALCIUM 20 MG/1
20 TABLET, FILM COATED ORAL DAILY
Qty: 90 TABLET | Refills: 3 | Status: SHIPPED | OUTPATIENT
Start: 2023-12-26 | End: 2024-12-25

## 2023-12-26 RX ORDER — GABAPENTIN 300 MG/1
CAPSULE ORAL
Qty: 60 CAPSULE | Refills: 11 | Status: SHIPPED | OUTPATIENT
Start: 2023-12-26 | End: 2024-12-25

## 2023-12-26 NOTE — TELEPHONE ENCOUNTER
Care Due:                  Date            Visit Type   Department     Provider  --------------------------------------------------------------------------------                                EP -                              PRIMARY      [facility]  Last Visit: 10-      CARE ([facility])   PRIMARY CARE   Clayton Esparza  Next Visit: None Scheduled  None         None Found                                                            Last  Test          Frequency    Reason                     Performed    Due Date  --------------------------------------------------------------------------------    CBC.........  12 months..  meloxicam................  01- 01-    CMP.........  12 months..  atorvastatin, meloxicam..  01- 01-    Lipid Panel.  12 months..  atorvastatin.............  01- 01-    [employer] Embedded Care Due Messages. Reference number: [id number].   12/26/2023 4:35:53 PM CST

## 2024-03-20 ENCOUNTER — TELEPHONE (OUTPATIENT)
Dept: NEUROSURGERY | Facility: CLINIC | Age: 59
End: 2024-03-20
Payer: COMMERCIAL

## 2024-04-11 ENCOUNTER — OFFICE VISIT (OUTPATIENT)
Dept: PRIMARY CARE CLINIC | Facility: CLINIC | Age: 59
End: 2024-04-11
Payer: COMMERCIAL

## 2024-04-11 VITALS
OXYGEN SATURATION: 96 % | BODY MASS INDEX: 41.79 KG/M2 | SYSTOLIC BLOOD PRESSURE: 120 MMHG | DIASTOLIC BLOOD PRESSURE: 86 MMHG | RESPIRATION RATE: 16 BRPM | WEIGHT: 260.06 LBS | HEART RATE: 84 BPM | TEMPERATURE: 98 F | HEIGHT: 66 IN

## 2024-04-11 DIAGNOSIS — M54.50 CHRONIC MIDLINE LOW BACK PAIN WITHOUT SCIATICA: ICD-10-CM

## 2024-04-11 DIAGNOSIS — Z00.00 ANNUAL PHYSICAL EXAM: Primary | ICD-10-CM

## 2024-04-11 DIAGNOSIS — E66.01 OBESITY, MORBID, BMI 40.0-49.9: ICD-10-CM

## 2024-04-11 DIAGNOSIS — G89.29 CHRONIC MIDLINE LOW BACK PAIN WITHOUT SCIATICA: ICD-10-CM

## 2024-04-11 DIAGNOSIS — M48.02 FORAMINAL STENOSIS OF CERVICAL REGION: ICD-10-CM

## 2024-04-11 PROCEDURE — 3008F BODY MASS INDEX DOCD: CPT | Mod: CPTII,S$GLB,, | Performed by: STUDENT IN AN ORGANIZED HEALTH CARE EDUCATION/TRAINING PROGRAM

## 2024-04-11 PROCEDURE — 3074F SYST BP LT 130 MM HG: CPT | Mod: CPTII,S$GLB,, | Performed by: STUDENT IN AN ORGANIZED HEALTH CARE EDUCATION/TRAINING PROGRAM

## 2024-04-11 PROCEDURE — 1160F RVW MEDS BY RX/DR IN RCRD: CPT | Mod: CPTII,S$GLB,, | Performed by: STUDENT IN AN ORGANIZED HEALTH CARE EDUCATION/TRAINING PROGRAM

## 2024-04-11 PROCEDURE — 99999 PR PBB SHADOW E&M-EST. PATIENT-LVL IV: CPT | Mod: PBBFAC,,, | Performed by: STUDENT IN AN ORGANIZED HEALTH CARE EDUCATION/TRAINING PROGRAM

## 2024-04-11 PROCEDURE — 3079F DIAST BP 80-89 MM HG: CPT | Mod: CPTII,S$GLB,, | Performed by: STUDENT IN AN ORGANIZED HEALTH CARE EDUCATION/TRAINING PROGRAM

## 2024-04-11 PROCEDURE — 1159F MED LIST DOCD IN RCRD: CPT | Mod: CPTII,S$GLB,, | Performed by: STUDENT IN AN ORGANIZED HEALTH CARE EDUCATION/TRAINING PROGRAM

## 2024-04-11 PROCEDURE — 99396 PREV VISIT EST AGE 40-64: CPT | Mod: S$GLB,,, | Performed by: STUDENT IN AN ORGANIZED HEALTH CARE EDUCATION/TRAINING PROGRAM

## 2024-04-11 RX ORDER — MELOXICAM 15 MG/1
15 TABLET ORAL DAILY
Qty: 30 TABLET | Refills: 5 | Status: SHIPPED | OUTPATIENT
Start: 2024-04-11

## 2024-04-11 RX ORDER — TRAMADOL HYDROCHLORIDE 50 MG/1
50 TABLET ORAL EVERY 12 HOURS PRN
COMMUNITY
Start: 2024-03-29 | End: 2024-04-11 | Stop reason: SDUPTHER

## 2024-04-11 RX ORDER — TRAMADOL HYDROCHLORIDE 50 MG/1
50 TABLET ORAL NIGHTLY PRN
Qty: 30 TABLET | Refills: 0 | Status: SHIPPED | OUTPATIENT
Start: 2024-04-25 | End: 2024-05-09 | Stop reason: SDUPTHER

## 2024-04-11 RX ORDER — CYCLOBENZAPRINE HCL 10 MG
10 TABLET ORAL NIGHTLY PRN
COMMUNITY
Start: 2024-03-13 | End: 2024-04-11

## 2024-04-11 NOTE — PROGRESS NOTES
"Subjective:           Patient ID: Pravin Acevedo   Age:  58 y.o.  Sex: male     Chief Complaint:   Annual Exam      History of Present Illness:    Pravin Acevedo is a 58 y.o. male who presents today with a chief complaint of Annual Exam  .      Patient states he has gotten a new MRI on the   Had MRI ordered by provider in [facility].    But that provider advised speaking w/ PCP as states is not able to get further healing to his shoulder and neck at this time.  Pt reports has been nearly 6 years and still an issue.    MRI from 2021 in media showing foraminal stenosis of cervical spine.   Had repeated MRI 3/22/22 still showing disc issues.   Has now again repeated MRI on 4/8/24 getting images of cervical and lumbar spine.    Saw Dr. Villalpando on 11/16/23 which did not give relief and pt did not feel was helpful.    Dr. Villalpando Plan:  "  Pertinent imaging studies reviewed by me. Imaging results were discussed with patient.  Advised patient to bring imaging disc any follow-up visits that images can be directly reviewed.  Increase nighttime dose of gabapentin 600 mg.  Would consider increasing daytime doses well, but patient drives a truck for a living and it would be best to avoid any sedation while doing so.    Continue meloxicam.    We discussed performing an epidural steroid injection.  Patient states that he is not quite interested in this option at this time.  He will consider in the future if pain worsens.  Return to clinic as needed.    "    Patient states he wants the provider to see the images before getting an injection.    Has concern about his work as .  States muscle in left shoulder and upper back jumps at times and gabapentin does help.  Does not feel his driving has been limited at all or affected in a negative way.   Does use Meloxicam or alleve during the day.    Has used tramadol and that has been helpful with sleeping.  Using less than nightly, was given " "#14 tabs by Yovanny Zhupeh on 4/1/24 and has over 7 tabs left at this time.     Gabapentin does help during the day and pairs w/ tramadol at night a times and can also be helpful.     Review of Systems   Constitutional: Negative.  Negative for chills, fatigue and fever.   HENT: Negative.  Negative for congestion, rhinorrhea, sinus pressure, sinus pain and sneezing.    Eyes: Negative.    Respiratory: Negative.  Negative for cough, shortness of breath and wheezing.    Cardiovascular: Negative.  Negative for chest pain, palpitations and leg swelling.   Gastrointestinal: Negative.  Negative for abdominal distention, constipation, diarrhea and vomiting.   Endocrine: Negative.    Genitourinary: Negative.  Negative for difficulty urinating and frequency.   Musculoskeletal:  Positive for arthralgias, back pain and joint swelling.   Skin: Negative.  Negative for rash.   Allergic/Immunologic: Negative for food allergies.   Neurological:  Positive for weakness (Left hand, 1st and 2nd digit). Negative for headaches.   Psychiatric/Behavioral: Negative.  Negative for sleep disturbance.            Objective:        Vitals:    04/11/24 0802   BP: 120/86   BP Location: Right arm   Patient Position: Sitting   BP Method: Medium (Manual)   Pulse: 84   Resp: 16   Temp: 98.3 °F (36.8 °C)   TempSrc: Oral   SpO2: 96%   Weight: 117.9 kg (260 lb 0.5 oz)   Height: 5' 6" (1.676 m)       Body mass index is 41.97 kg/m².      Physical Exam  Constitutional:       Appearance: Normal appearance. He is obese. He is not ill-appearing or toxic-appearing.      Comments: As per BMI.   HENT:      Head: Normocephalic and atraumatic.      Right Ear: External ear normal.      Left Ear: External ear normal.      Nose: No congestion.      Mouth/Throat:      Mouth: Mucous membranes are moist.      Pharynx: Oropharynx is clear.   Eyes:      Extraocular Movements: Extraocular movements intact.      Conjunctiva/sclera: Conjunctivae normal.   Cardiovascular:      " "Rate and Rhythm: Normal rate and regular rhythm.      Pulses: Normal pulses.      Heart sounds: No murmur heard.  Pulmonary:      Effort: Pulmonary effort is normal. No respiratory distress.      Breath sounds: No wheezing.   Abdominal:      General: Bowel sounds are normal.      Palpations: Abdomen is soft.      Tenderness: There is no right CVA tenderness or left CVA tenderness.   Musculoskeletal:         General: No swelling.      Cervical back: Normal range of motion.      Right lower leg: No edema.      Left lower leg: No edema.   Skin:     General: Skin is warm.      Capillary Refill: Capillary refill takes less than 2 seconds.      Coloration: Skin is not jaundiced.   Neurological:      General: No focal deficit present.      Mental Status: He is alert and oriented to person, place, and time.      Motor: Weakness present.      Comments: On exam patient's left hand demonstrate some weakness to left thumb and left index finger, having decreased strength relative to right side.   Psychiatric:         Mood and Affect: Mood normal.           No past medical history on file.    Lab Results   Component Value Date     01/11/2023    K 4.8 01/11/2023     01/11/2023    CO2 25 01/11/2023    BUN 12 01/11/2023    CREATININE 1.0 01/11/2023    ANIONGAP 9 01/11/2023     Lab Results   Component Value Date    HGBA1C 5.6 01/11/2023     No results found for: "BNP", "BNPTRIAGEBLO"    Lab Results   Component Value Date    WBC 4.22 01/11/2023    HGB 13.8 (L) 01/11/2023    HCT 42.8 01/11/2023     01/11/2023    GRAN 2.4 01/11/2023    GRAN 57.6 01/11/2023     Lab Results   Component Value Date    CHOL 181 01/11/2023    HDL 40 01/11/2023    LDLCALC 114.6 01/11/2023    TRIG 132 01/11/2023        Outpatient Encounter Medications as of 4/11/2024   Medication Sig Dispense Refill    atorvastatin (LIPITOR) 20 MG tablet Take 1 tablet (20 mg total) by mouth once daily. 90 tablet 3    gabapentin (NEURONTIN) 300 MG capsule Take " 1 capsule (300 mg total) by mouth once daily AND 2 capsules (600 mg total) every evening. 60 capsule 11    [DISCONTINUED] cyclobenzaprine (FLEXERIL) 10 MG tablet Take 10 mg by mouth nightly as needed.      [DISCONTINUED] meloxicam (MOBIC) 15 MG tablet Take 1 tablet (15 mg total) by mouth once daily. 30 tablet 5    [DISCONTINUED] traMADoL (ULTRAM) 50 mg tablet Take 50 mg by mouth every 12 (twelve) hours as needed for Pain.      meloxicam (MOBIC) 15 MG tablet Take 1 tablet (15 mg total) by mouth once daily. 30 tablet 5    [START ON 4/25/2024] traMADoL (ULTRAM) 50 mg tablet Take 1 tablet (50 mg total) by mouth nightly as needed for Pain. 30 tablet 0     No facility-administered encounter medications on file as of 4/11/2024.          Assessment:       1. Annual physical exam    2. Foraminal stenosis of cervical region    3. Chronic midline low back pain without sciatica    4. Obesity, morbid, BMI 40.0-49.9           Plan:       Annual physical exam   - patient presents for annual appointment.  Vitals stable.  Generally well, though having some chronic back pain to upper and lower back.  Occasionally gets mild weakness to index finger and ring finger of left hand.   - has been following with pain management specialist care for this issue.  But feels that his outside providers have been struggling to adequately address his current concerns.    Foraminal stenosis of cervical region  -     traMADoL (ULTRAM) 50 mg tablet; Take 1 tablet (50 mg total) by mouth nightly as needed for Pain.  Dispense: 30 tablet; Refill: 0  -     meloxicam (MOBIC) 15 MG tablet; Take 1 tablet (15 mg total) by mouth once daily.  Dispense: 30 tablet; Refill: 5   - patient is using gabapentin, meloxicam and tramadol to help control pain as well as allow him to sleep adequately with this issue.   - recommend following up with pain management as was directed at last appointment with MRI to review imaging.  If not getting adequate relief of symptoms with  "pain management, can also speak with Neurology or Neurosurgery about other interventions if necessary, the patient would prefer to avoid any major spine surgeries    Chronic midline low back pain without sciatica   - chronic back issues, would advise continued work on weight loss, stretching activity, and following up with pain management for their recommended interventions.    Obesity, morbid, BMI 40.0-49.9  Weight Loss:   - Body mass index is 41.97 kg/m².   - Normal weight is BMI 18-24.9.     - Overweight: 25-29.9  - Class 1 Obesity: 30-34.9  - Class 2 Obesity: 35-39.9  - Class 3 Obesity: 40+  - A BMI under 18 also shows diminished health outcomes.   - best health outcomes have been seen at a BMI of 22.    - excess weigh affects many body systems, including: cardiac, respiratory, GI, endocrine, musculoskeletal, dermatologic, reproductive, mental health and more.   - recommended moderate weight change, 1-2lbs per weeks.   - focus on eating a healthy sustainable diet.  Use food diary for at least a couple weeks to better understand what your diet.   - consider jackie such as "Lose It" or "Noom".   - avoid empty calories that you may use daily from items such as like soda, sweet tea, sugary coffee, ice cream, cake/pie, cookies/brownies/crackers or candy.  An occasional piece of birthday cake is not the cause of obesity, but a daily Frappaccino could be to blame.    - "Low Fat" on a box or bag should be eyed with caution, this fat it typically replaced with forms of sugar/carbs and the resultant product may be less healthy than other products.   - when possible eating whole foods is almost always preferable.  A diet of salads, green beans, broccoli, cauliflower, cucumbers, sweet potatoes, peppers, olives/avocados, tomatoes, beats, berries, eggs, meat/fish, shrimp/crawfish with some limited fruit (apples/oranges/bananas/grapes) and even more limited grains/starches (pasta/rice/bread/potatoes/cereal) will serve you much " better in the long term than eating a bunch of diet bars, shakes or powders.     - Exercise has many benefits (heart health, improved mood/energy, higher self esteem, less depression, greater strength/flexibility, better sleep, less stress/anxiety, improved immune system, stronger bones, improved cognition, fewer colds/asthma exacerbations), it also does help lose weight.  But weight loss from exercise is much less impactful than when a change in diet can achieve.  Exercise is highly encouraged, but diet change should be the primary tool used to lose weight.

## 2024-04-11 NOTE — PATIENT INSTRUCTIONS
Annual physical exam   - patient presents for annual appointment.  Vitals stable.  Generally well, though having some chronic back pain to upper and lower back.  Occasionally gets mild weakness to index finger and ring finger of left hand.   - has been following with pain management specialist care for this issue.  But feels that his outside providers have been struggling to adequately address his current concerns.    Foraminal stenosis of cervical region  -     traMADoL (ULTRAM) 50 mg tablet; Take 1 tablet (50 mg total) by mouth nightly as needed for Pain.  Dispense: 30 tablet; Refill: 0  -     meloxicam (MOBIC) 15 MG tablet; Take 1 tablet (15 mg total) by mouth once daily.  Dispense: 30 tablet; Refill: 5   - patient is using gabapentin, meloxicam and tramadol to help control pain as well as allow him to sleep adequately with this issue.   - recommend following up with pain management as was directed at last appointment with MRI to review imaging.  If not getting adequate relief of symptoms with pain management, can also speak with Neurology or Neurosurgery about other interventions if necessary, the patient would prefer to avoid any major spine surgeries    Chronic midline low back pain without sciatica   - chronic back issues, would advise continued work on weight loss, stretching activity, and following up with pain management for their recommended interventions.    Obesity, morbid, BMI 40.0-49.9  Weight Loss:   - Body mass index is 41.97 kg/m².   - Normal weight is BMI 18-24.9.     - Overweight: 25-29.9  - Class 1 Obesity: 30-34.9  - Class 2 Obesity: 35-39.9  - Class 3 Obesity: 40+  - A BMI under 18 also shows diminished health outcomes.   - best health outcomes have been seen at a BMI of 22.    - excess weigh affects many body systems, including: cardiac, respiratory, GI, endocrine, musculoskeletal, dermatologic, reproductive, mental health and more.   - recommended moderate weight change, 1-2lbs per  "weeks.   - focus on eating a healthy sustainable diet.  Use food diary for at least a couple weeks to better understand what your diet.   - consider jackie such as "Lose It" or "Noom".   - avoid empty calories that you may use daily from items such as like soda, sweet tea, sugary coffee, ice cream, cake/pie, cookies/brownies/crackers or candy.  An occasional piece of birthday cake is not the cause of obesity, but a daily Frappaccino could be to blame.    - "Low Fat" on a box or bag should be eyed with caution, this fat it typically replaced with forms of sugar/carbs and the resultant product may be less healthy than other products.   - when possible eating whole foods is almost always preferable.  A diet of salads, green beans, broccoli, cauliflower, cucumbers, sweet potatoes, peppers, olives/avocados, tomatoes, beats, berries, eggs, meat/fish, shrimp/crawfish with some limited fruit (apples/oranges/bananas/grapes) and even more limited grains/starches (pasta/rice/bread/potatoes/cereal) will serve you much better in the long term than eating a bunch of diet bars, shakes or powders.     - Exercise has many benefits (heart health, improved mood/energy, higher self esteem, less depression, greater strength/flexibility, better sleep, less stress/anxiety, improved immune system, stronger bones, improved cognition, fewer colds/asthma exacerbations), it also does help lose weight.  But weight loss from exercise is much less impactful than when a change in diet can achieve.  Exercise is highly encouraged, but diet change should be the primary tool used to lose weight.     "

## 2024-04-24 ENCOUNTER — OFFICE VISIT (OUTPATIENT)
Dept: NEUROSURGERY | Facility: CLINIC | Age: 59
End: 2024-04-24
Payer: COMMERCIAL

## 2024-04-24 VITALS
TEMPERATURE: 99 F | WEIGHT: 262.13 LBS | HEART RATE: 79 BPM | SYSTOLIC BLOOD PRESSURE: 129 MMHG | BODY MASS INDEX: 42.31 KG/M2 | DIASTOLIC BLOOD PRESSURE: 81 MMHG

## 2024-04-24 DIAGNOSIS — M48.02 SPINAL STENOSIS, CERVICAL REGION: Primary | ICD-10-CM

## 2024-04-24 DIAGNOSIS — M25.512 CHRONIC LEFT SHOULDER PAIN: ICD-10-CM

## 2024-04-24 DIAGNOSIS — M47.812 CERVICAL SPONDYLOSIS: ICD-10-CM

## 2024-04-24 DIAGNOSIS — G89.29 CHRONIC LEFT SHOULDER PAIN: ICD-10-CM

## 2024-04-24 PROCEDURE — 3074F SYST BP LT 130 MM HG: CPT | Mod: CPTII,S$GLB,, | Performed by: NURSE PRACTITIONER

## 2024-04-24 PROCEDURE — 1159F MED LIST DOCD IN RCRD: CPT | Mod: CPTII,S$GLB,, | Performed by: NURSE PRACTITIONER

## 2024-04-24 PROCEDURE — 99204 OFFICE O/P NEW MOD 45 MIN: CPT | Mod: S$GLB,,, | Performed by: NURSE PRACTITIONER

## 2024-04-24 PROCEDURE — 3079F DIAST BP 80-89 MM HG: CPT | Mod: CPTII,S$GLB,, | Performed by: NURSE PRACTITIONER

## 2024-04-24 PROCEDURE — 3008F BODY MASS INDEX DOCD: CPT | Mod: CPTII,S$GLB,, | Performed by: NURSE PRACTITIONER

## 2024-04-24 PROCEDURE — 1160F RVW MEDS BY RX/DR IN RCRD: CPT | Mod: CPTII,S$GLB,, | Performed by: NURSE PRACTITIONER

## 2024-04-24 PROCEDURE — 99999 PR PBB SHADOW E&M-EST. PATIENT-LVL IV: CPT | Mod: PBBFAC,,, | Performed by: NURSE PRACTITIONER

## 2024-04-24 NOTE — PROGRESS NOTES
Neurosurgery  History & Physical    SUBJECTIVE:     History of Present Illness: Pravin Acevedo is a 58 y.o. male being seen in clinic today to discuss concerns with chronic neck and left shoulder/arm pain. States that he has obtained an injection in the past with mild relief and continues to take gabapentin, Tramadol, and Meloxicam as needed with relief. He is apprehensive about surgery but concerned about the progression of his pain and paraesthesias. Describes the pain as aching down the left-side of the neck extending into the shoulder and down into the 1st and 2nd digits. Denies right-sided pain. Rates the pain as a 7/10. Aggravating factors include movement and lifting objects. Alleviating factors include rest and medications. Denies b/b dysfunction, saddle anesthesia, or gait instability. Endorses weakness in the hand when the pain is severe. Denies frequent dropping of objects.     Review of patient's allergies indicates:  No Known Allergies    Current Outpatient Medications   Medication Sig Dispense Refill    atorvastatin (LIPITOR) 20 MG tablet Take 1 tablet (20 mg total) by mouth once daily. 90 tablet 3    gabapentin (NEURONTIN) 300 MG capsule Take 1 capsule (300 mg total) by mouth once daily AND 2 capsules (600 mg total) every evening. 60 capsule 11    meloxicam (MOBIC) 15 MG tablet Take 1 tablet (15 mg total) by mouth once daily. 30 tablet 5    [START ON 4/25/2024] traMADoL (ULTRAM) 50 mg tablet Take 1 tablet (50 mg total) by mouth nightly as needed for Pain. 30 tablet 0     No current facility-administered medications for this visit.       No past medical history on file.  No past surgical history on file.  Family History       Problem Relation (Age of Onset)    No Known Problems Mother, Father          Social History     Socioeconomic History    Marital status:    Tobacco Use    Smoking status: Never    Smokeless tobacco: Never   Substance and Sexual Activity    Alcohol use: Yes      Alcohol/week: 6.0 standard drinks of alcohol     Types: 6 Cans of beer per week     Comment: 6 beer per weekend    Drug use: Never    Sexual activity: Yes     Partners: Female       Review of Systems    OBJECTIVE:     Vital Signs     There is no height or weight on file to calculate BMI.      Neurosurgery Physical Exam  General: well developed, well nourished, no distress.   Head: normocephalic, atraumatic  Neurologic: Alert and oriented. Thought content appropriate.  GCS: Motor: 6/Verbal: 5/Eyes: 4 GCS Total: 15  Mental Status: Awake, Alert, Oriented x 4  Language: No aphasia  Speech: No dysarthria  Cranial nerves: face symmetric, tongue midline, CN II-XII grossly intact.   Eyes: pupils equal, round, reactive to light with accomodation, EOMI.   Pulmonary: normal respirations, no signs of respiratory distress  Abdomen: soft, non-distended  Skin: Skin is warm, dry and intact.  Sensory: intact to light touch throughout  Motor Strength:Moves all extremities spontaneously with good tone.   Strength  Deltoids Triceps Biceps Wrist Extension Wrist Flexion Hand    Upper: R 5/5 5/5 5/5 5/5 5/5 5/5    L 5/5 5/5 5/5 5/5 5/5 5/5     HF KE KF DF PF EHL   Lower: R 5/5 5/5 5/5 5/5 5/5 5/5    L 5/5 5/5 5/5 5/5 5/5 5/5     Reflexes:   Talley's: Negative.     Cerebellar:   Gait stable, fluid.   Tandem Gait: No difficulty  Able to walk on heels & toes     Cervical:   ROM: Full with flexion, extension, lateral rotation and ear-to-shoulder bend.   Midline TTP: Negative.    Diagnostic Results:  I have personally reviewed the MRI cervical spine dated 4/8/24 which shows multilevel degenerative changes most pronounced C5-6 and C6-7 with moderate to severe neural foraminal stenosis.       ASSESSMENT/PLAN:     Pravin Acevedo is a 58 y.o. male seen in clinic today as a referral from his PCP to discuss concerns with worsening cervical radiculopathy. States that he is apprehensive about surgery. I will reach out to Dr. Villalpando to discuss  proceeding with injections. I have ordered a CT cervical spine and dynamic x-ray for instability. I would like him to see Dr. Almonte to discuss surgical options. He was agreeable. We reviewed red flag symptoms.     I would like the patient to follow-up in clinic with Dr. Almonte. I have encouraged him to contact the clinic with any questions, concerns, or adverse clinical changes. He verbalized understanding.      RADHA Bonds  Neurosurgery  [facility].      Note dictated with voice recognition software, please excuse any grammatical errors.

## 2024-05-08 DIAGNOSIS — M25.512 LEFT SHOULDER PAIN, UNSPECIFIED CHRONICITY: Primary | ICD-10-CM

## 2024-05-09 ENCOUNTER — OFFICE VISIT (OUTPATIENT)
Dept: PRIMARY CARE CLINIC | Facility: CLINIC | Age: 59
End: 2024-05-09
Payer: COMMERCIAL

## 2024-05-09 VITALS
HEART RATE: 86 BPM | WEIGHT: 264.88 LBS | HEIGHT: 66 IN | SYSTOLIC BLOOD PRESSURE: 130 MMHG | TEMPERATURE: 98 F | BODY MASS INDEX: 42.57 KG/M2 | OXYGEN SATURATION: 96 % | RESPIRATION RATE: 16 BRPM | DIASTOLIC BLOOD PRESSURE: 72 MMHG

## 2024-05-09 DIAGNOSIS — E66.01 OBESITY, MORBID, BMI 40.0-49.9: ICD-10-CM

## 2024-05-09 DIAGNOSIS — M48.02 FORAMINAL STENOSIS OF CERVICAL REGION: ICD-10-CM

## 2024-05-09 DIAGNOSIS — M54.50 CHRONIC MIDLINE LOW BACK PAIN WITHOUT SCIATICA: ICD-10-CM

## 2024-05-09 DIAGNOSIS — Z00.00 ANNUAL PHYSICAL EXAM: Primary | ICD-10-CM

## 2024-05-09 DIAGNOSIS — Z79.899 OTHER LONG TERM (CURRENT) DRUG THERAPY: ICD-10-CM

## 2024-05-09 DIAGNOSIS — G89.29 CHRONIC MIDLINE LOW BACK PAIN WITHOUT SCIATICA: ICD-10-CM

## 2024-05-09 PROCEDURE — 99999 PR PBB SHADOW E&M-EST. PATIENT-LVL III: CPT | Mod: PBBFAC,,, | Performed by: STUDENT IN AN ORGANIZED HEALTH CARE EDUCATION/TRAINING PROGRAM

## 2024-05-09 PROCEDURE — 3008F BODY MASS INDEX DOCD: CPT | Mod: CPTII,S$GLB,, | Performed by: STUDENT IN AN ORGANIZED HEALTH CARE EDUCATION/TRAINING PROGRAM

## 2024-05-09 PROCEDURE — 3078F DIAST BP <80 MM HG: CPT | Mod: CPTII,S$GLB,, | Performed by: STUDENT IN AN ORGANIZED HEALTH CARE EDUCATION/TRAINING PROGRAM

## 2024-05-09 PROCEDURE — 99396 PREV VISIT EST AGE 40-64: CPT | Mod: S$GLB,,, | Performed by: STUDENT IN AN ORGANIZED HEALTH CARE EDUCATION/TRAINING PROGRAM

## 2024-05-09 PROCEDURE — 3075F SYST BP GE 130 - 139MM HG: CPT | Mod: CPTII,S$GLB,, | Performed by: STUDENT IN AN ORGANIZED HEALTH CARE EDUCATION/TRAINING PROGRAM

## 2024-05-09 PROCEDURE — 1159F MED LIST DOCD IN RCRD: CPT | Mod: CPTII,S$GLB,, | Performed by: STUDENT IN AN ORGANIZED HEALTH CARE EDUCATION/TRAINING PROGRAM

## 2024-05-09 RX ORDER — PREDNISONE 10 MG/1
TABLET ORAL
Qty: 13 TABLET | Refills: 0 | Status: SHIPPED | OUTPATIENT
Start: 2024-05-09 | End: 2024-05-30

## 2024-05-09 RX ORDER — TRAMADOL HYDROCHLORIDE 50 MG/1
50 TABLET ORAL NIGHTLY PRN
Qty: 30 TABLET | Refills: 0 | Status: SHIPPED | OUTPATIENT
Start: 2024-05-09

## 2024-05-09 NOTE — PATIENT INSTRUCTIONS
Annual physical exam  -  CBC, CMP, TSH, Lipids, and A1c.  - continues to have neck and back pain related to work activities.  Is seeing ortho tomorrow, saw Neurosurgery last week.  Will be seeing pain management in 2-3 weeks.  - otherwise doing generally well.  Is interested in getting annual labs drawn today.      Foraminal stenosis of cervical region  -     traMADoL (ULTRAM) 50 mg tablet; Take 1 tablet (50 mg total) by mouth nightly as needed for Pain.  Dispense: 30 tablet; Refill: 0  -     predniSONE (DELTASONE) 10 MG tablet; 3 tabs (30mg) by mouth for 2 days, 2 tabs (20mg) for 2 days, then 1 tab (10mg) for 3 days.  Dispense: 13 tablet; Refill: 0   - discussed patient's neck and back pain.  Has been getting moderate relief with meloxicam 15 mg in the morning, and using gabapentin 300 mg in the morning and 600 mg at night.   - on days when his pain is increased will use tramadol 50 mg, states he only uses this once every 2-3 days.  Got 30 tablets of tramadol proximally 60 days ago, and states he has not out of medication yet, has a few pills remaining.   - patient reduce his his evening dose of gabapentin from 600 mg to 300 mg on the days he uses tramadol after work.   - will discuss this further with ortho and pain management.  Was interested in other medicinal interventions, thus discussed a trial of a prednisone taper reduce pain and inflammation.    Obesity, morbid, BMI 40.0-49.9  -     CBC Auto Differential; Future; Expected date: 05/09/2024  -     Comprehensive Metabolic Panel; Future; Expected date: 05/09/2024  -     Lipid Panel; Future; Expected date: 05/09/2024   - continue working on weight loss.  Getting fasting blood work tomorrow.    Chronic midline low back pain without sciatica  -     predniSONE (DELTASONE) 10 MG tablet; 3 tabs (30mg) by mouth for 2 days, 2 tabs (20mg) for 2 days, then 1 tab (10mg) for 3 days.  Dispense: 13 tablet; Refill: 0    Other long term (current) drug therapy  -  CBC, CMP,  "TSH, Lipids, and A1c.        Weight Loss:   - Body mass index is 42.75 kg/m².   - Normal weight is BMI 18-24.9.     - Overweight: 25-29.9  - Class 1 Obesity: 30-34.9  - Class 2 Obesity: 35-39.9  - Class 3 Obesity: 40+  - A BMI under 18 also shows diminished health outcomes.   - best health outcomes have been seen at a BMI of 22.    - excess weigh affects many body systems, including: cardiac, respiratory, GI, endocrine, musculoskeletal, dermatologic, reproductive, mental health and more.   - recommended moderate weight change, 1-2lbs per weeks.   - focus on eating a healthy sustainable diet.  Use food diary for at least a couple weeks to better understand what your diet.   - consider jackie such as "Lose It" or "Noom".   - avoid empty calories that you may use daily from items such as like soda, sweet tea, sugary coffee, ice cream, cake/pie, cookies/brownies/crackers or candy.  An occasional piece of birthday cake is not the cause of obesity, but a daily Frappaccino could be to blame.    - "Low Fat" on a box or bag should be eyed with caution, this fat it typically replaced with forms of sugar/carbs and the resultant product may be less healthy than other products.   - when possible eating whole foods is almost always preferable.  A diet of salads, green beans, broccoli, cauliflower, cucumbers, sweet potatoes, peppers, olives/avocados, tomatoes, beats, berries, eggs, meat/fish, shrimp/crawfish with some limited fruit (apples/oranges/bananas/grapes) and even more limited grains/starches (pasta/rice/bread/potatoes/cereal) will serve you much better in the long term than eating a bunch of diet bars, shakes or powders.     - Exercise has many benefits (heart health, improved mood/energy, higher self esteem, less depression, greater strength/flexibility, better sleep, less stress/anxiety, improved immune system, stronger bones, improved cognition, fewer colds/asthma exacerbations), it also does help lose weight.  But weight " loss from exercise is much less impactful than when a change in diet can achieve.  Exercise is highly encouraged, but diet change should be the primary tool used to lose weight.

## 2024-05-09 NOTE — PROGRESS NOTES
"Subjective:           Patient ID: Pravin Acevedo   Age:  58 y.o.  Sex: male     Chief Complaint:   Follow-up (Neck pain & MRI results)      History of Present Illness:    Pravin Acevedo is a 58 y.o. male who presents today with a chief complaint of Follow-up (Neck pain & MRI results)  .    Pt with hx of back pain with worsening cervical radiculopathy.    Saw Neurosurgery on 4/24, plan states,  "58 y.o. male seen in clinic today as a referral from his PCP to discuss concerns with worsening cervical radiculopathy. States that he is apprehensive about surgery. I will reach out to Dr. Villalpando to discuss proceeding with injections. I have ordered a CT cervical spine and dynamic x-ray for instability. I would like him to see Dr. Almonte to discuss surgical options. He was agreeable. We reviewed red flag symptoms.      I would like the patient to follow-up in clinic with Dr. Almonte. I have encouraged him to contact the clinic with any questions, concerns, or adverse clinical changes. He verbalized understanding.       RADHA Bonds  Neurosurgery  "    Has an appt w/ Shikha Allen on 5/10, then an appt with Dr. iVllalpando on 5/30/24.     States his current meds are adequate and is not interferring with his work.    Says pain controlled with his oral meds.    Was advised he needed to stay home and rest, but patient states he can't afford to miss work, just doesn't have the funds to be out of work.    Taking:  Gabapentin 300mg in AM, and 600mg in evening.  Meloxicam 15mg in the AM.  Tramadol 50mg using PRN, states uses 1 tab about ever 2 or 3 days, when his work has been harder and needs that extra relief.  Takes after work and if using, only take 1 gabapentin that night.           Review of Systems   Constitutional: Negative.  Negative for chills, fatigue and fever.   HENT: Negative.  Negative for congestion, rhinorrhea, sinus pressure, sinus pain and sneezing.    Eyes: Negative.    Respiratory: " "Negative.  Negative for cough, shortness of breath and wheezing.    Cardiovascular: Negative.  Negative for chest pain, palpitations and leg swelling.   Gastrointestinal: Negative.  Negative for abdominal distention, constipation, diarrhea and vomiting.   Endocrine: Negative.    Genitourinary: Negative.  Negative for difficulty urinating and frequency.   Musculoskeletal:  Positive for arthralgias, back pain and joint swelling.   Skin: Negative.  Negative for rash.   Allergic/Immunologic: Negative for food allergies.   Neurological:  Positive for weakness (Left hand, 1st and 2nd digit). Negative for headaches.   Psychiatric/Behavioral: Negative.  Negative for sleep disturbance.            Objective:        Vitals:    05/09/24 0815   BP: 130/72   BP Location: Right arm   Patient Position: Sitting   BP Method: Medium (Manual)   Pulse: 86   Resp: 16   Temp: 98.3 °F (36.8 °C)   TempSrc: Oral   SpO2: 96%   Weight: 120.1 kg (264 lb 14.1 oz)   Height: 5' 6" (1.676 m)       Body mass index is 42.75 kg/m².      Physical Exam  Constitutional:       Appearance: Normal appearance. He is obese. He is not ill-appearing or toxic-appearing.      Comments: As per BMI.   HENT:      Head: Normocephalic and atraumatic.      Right Ear: External ear normal.      Left Ear: External ear normal.      Nose: No congestion.      Mouth/Throat:      Mouth: Mucous membranes are moist.      Pharynx: Oropharynx is clear.   Eyes:      Extraocular Movements: Extraocular movements intact.      Conjunctiva/sclera: Conjunctivae normal.   Cardiovascular:      Rate and Rhythm: Normal rate and regular rhythm.      Pulses: Normal pulses.      Heart sounds: No murmur heard.  Pulmonary:      Effort: Pulmonary effort is normal. No respiratory distress.      Breath sounds: No wheezing.   Abdominal:      General: Bowel sounds are normal.      Palpations: Abdomen is soft.      Tenderness: There is no right CVA tenderness or left CVA tenderness.   Musculoskeletal:  " "       General: No swelling.      Cervical back: Normal range of motion.      Right lower leg: No edema.      Left lower leg: No edema.   Skin:     General: Skin is warm.      Capillary Refill: Capillary refill takes less than 2 seconds.      Coloration: Skin is not jaundiced.   Neurological:      General: No focal deficit present.      Mental Status: He is alert and oriented to person, place, and time.      Motor: Weakness present.      Comments: On exam patient's left hand demonstrate some weakness to left thumb and left index finger, having decreased strength relative to right side.   Psychiatric:         Mood and Affect: Mood normal.           No past medical history on file.    Lab Results   Component Value Date     01/11/2023    K 4.8 01/11/2023     01/11/2023    CO2 25 01/11/2023    BUN 12 01/11/2023    CREATININE 1.0 01/11/2023    ANIONGAP 9 01/11/2023     Lab Results   Component Value Date    HGBA1C 5.6 01/11/2023     No results found for: "BNP", "BNPTRIAGEBLO"    Lab Results   Component Value Date    WBC 4.22 01/11/2023    HGB 13.8 (L) 01/11/2023    HCT 42.8 01/11/2023     01/11/2023    GRAN 2.4 01/11/2023    GRAN 57.6 01/11/2023     Lab Results   Component Value Date    CHOL 181 01/11/2023    HDL 40 01/11/2023    LDLCALC 114.6 01/11/2023    TRIG 132 01/11/2023        Outpatient Encounter Medications as of 5/9/2024   Medication Sig Dispense Refill    atorvastatin (LIPITOR) 20 MG tablet Take 1 tablet (20 mg total) by mouth once daily. 90 tablet 3    gabapentin (NEURONTIN) 300 MG capsule Take 1 capsule (300 mg total) by mouth once daily AND 2 capsules (600 mg total) every evening. 60 capsule 11    meloxicam (MOBIC) 15 MG tablet Take 1 tablet (15 mg total) by mouth once daily. 30 tablet 5    [DISCONTINUED] traMADoL (ULTRAM) 50 mg tablet Take 1 tablet (50 mg total) by mouth nightly as needed for Pain. 30 tablet 0    predniSONE (DELTASONE) 10 MG tablet 3 tabs (30mg) by mouth for 2 days, 2 " tabs (20mg) for 2 days, then 1 tab (10mg) for 3 days. 13 tablet 0    traMADoL (ULTRAM) 50 mg tablet Take 1 tablet (50 mg total) by mouth nightly as needed for Pain. 30 tablet 0     No facility-administered encounter medications on file as of 5/9/2024.          Assessment:       1. Annual physical exam    2. Foraminal stenosis of cervical region    3. Obesity, morbid, BMI 40.0-49.9    4. Chronic midline low back pain without sciatica    5. Other long term (current) drug therapy           Plan:         Annual physical exam  -  CBC, CMP, TSH, Lipids, and A1c.  - continues to have neck and back pain related to work activities.  Is seeing ortho tomorrow, saw Neurosurgery last week.  Will be seeing pain management in 2-3 weeks.  - otherwise doing generally well.  Is interested in getting annual labs drawn today.      Foraminal stenosis of cervical region  -     traMADoL (ULTRAM) 50 mg tablet; Take 1 tablet (50 mg total) by mouth nightly as needed for Pain.  Dispense: 30 tablet; Refill: 0  -     predniSONE (DELTASONE) 10 MG tablet; 3 tabs (30mg) by mouth for 2 days, 2 tabs (20mg) for 2 days, then 1 tab (10mg) for 3 days.  Dispense: 13 tablet; Refill: 0   - discussed patient's neck and back pain.  Has been getting moderate relief with meloxicam 15 mg in the morning, and using gabapentin 300 mg in the morning and 600 mg at night.   - on days when his pain is increased will use tramadol 50 mg, states he only uses this once every 2-3 days.  Got 30 tablets of tramadol proximally 60 days ago, and states he has not out of medication yet, has a few pills remaining.   - patient reduce his his evening dose of gabapentin from 600 mg to 300 mg on the days he uses tramadol after work.   - will discuss this further with ortho and pain management.  Was interested in other medicinal interventions, thus discussed a trial of a prednisone taper reduce pain and inflammation.    Obesity, morbid, BMI 40.0-49.9  -     CBC Auto Differential;  "Future; Expected date: 05/09/2024  -     Comprehensive Metabolic Panel; Future; Expected date: 05/09/2024  -     Lipid Panel; Future; Expected date: 05/09/2024   - continue working on weight loss.  Getting fasting blood work tomorrow.    Chronic midline low back pain without sciatica  -     predniSONE (DELTASONE) 10 MG tablet; 3 tabs (30mg) by mouth for 2 days, 2 tabs (20mg) for 2 days, then 1 tab (10mg) for 3 days.  Dispense: 13 tablet; Refill: 0    Other long term (current) drug therapy  -  CBC, CMP, TSH, Lipids, and A1c.        Weight Loss:   - Body mass index is 42.75 kg/m².   - Normal weight is BMI 18-24.9.     - Overweight: 25-29.9  - Class 1 Obesity: 30-34.9  - Class 2 Obesity: 35-39.9  - Class 3 Obesity: 40+  - A BMI under 18 also shows diminished health outcomes.   - best health outcomes have been seen at a BMI of 22.    - excess weigh affects many body systems, including: cardiac, respiratory, GI, endocrine, musculoskeletal, dermatologic, reproductive, mental health and more.   - recommended moderate weight change, 1-2lbs per weeks.   - focus on eating a healthy sustainable diet.  Use food diary for at least a couple weeks to better understand what your diet.   - consider jackie such as "Lose It" or "Noom".   - avoid empty calories that you may use daily from items such as like soda, sweet tea, sugary coffee, ice cream, cake/pie, cookies/brownies/crackers or candy.  An occasional piece of birthday cake is not the cause of obesity, but a daily Frappaccino could be to blame.    - "Low Fat" on a box or bag should be eyed with caution, this fat it typically replaced with forms of sugar/carbs and the resultant product may be less healthy than other products.   - when possible eating whole foods is almost always preferable.  A diet of salads, green beans, broccoli, cauliflower, cucumbers, sweet potatoes, peppers, olives/avocados, tomatoes, beats, berries, eggs, meat/fish, shrimp/crawfish with some limited fruit " (apples/oranges/bananas/grapes) and even more limited grains/starches (pasta/rice/bread/potatoes/cereal) will serve you much better in the long term than eating a bunch of diet bars, shakes or powders.     - Exercise has many benefits (heart health, improved mood/energy, higher self esteem, less depression, greater strength/flexibility, better sleep, less stress/anxiety, improved immune system, stronger bones, improved cognition, fewer colds/asthma exacerbations), it also does help lose weight.  But weight loss from exercise is much less impactful than when a change in diet can achieve.  Exercise is highly encouraged, but diet change should be the primary tool used to lose weight.

## 2024-05-10 ENCOUNTER — OFFICE VISIT (OUTPATIENT)
Dept: ORTHOPEDICS | Facility: CLINIC | Age: 59
End: 2024-05-10
Payer: COMMERCIAL

## 2024-05-10 VITALS
WEIGHT: 260.13 LBS | BODY MASS INDEX: 41.8 KG/M2 | HEART RATE: 76 BPM | HEIGHT: 66 IN | DIASTOLIC BLOOD PRESSURE: 93 MMHG | SYSTOLIC BLOOD PRESSURE: 143 MMHG

## 2024-05-10 DIAGNOSIS — G89.29 CHRONIC LEFT SHOULDER PAIN: ICD-10-CM

## 2024-05-10 DIAGNOSIS — M25.512 CHRONIC LEFT SHOULDER PAIN: ICD-10-CM

## 2024-05-10 PROCEDURE — 99999 PR PBB SHADOW E&M-EST. PATIENT-LVL III: CPT | Mod: PBBFAC,,,

## 2024-05-10 PROCEDURE — 3008F BODY MASS INDEX DOCD: CPT | Mod: CPTII,S$GLB,,

## 2024-05-10 PROCEDURE — 1160F RVW MEDS BY RX/DR IN RCRD: CPT | Mod: CPTII,S$GLB,,

## 2024-05-10 PROCEDURE — 1159F MED LIST DOCD IN RCRD: CPT | Mod: CPTII,S$GLB,,

## 2024-05-10 PROCEDURE — 99203 OFFICE O/P NEW LOW 30 MIN: CPT | Mod: 25,S$GLB,,

## 2024-05-10 PROCEDURE — 3080F DIAST BP >= 90 MM HG: CPT | Mod: CPTII,S$GLB,,

## 2024-05-10 PROCEDURE — 20610 DRAIN/INJ JOINT/BURSA W/O US: CPT | Mod: LT,S$GLB,,

## 2024-05-10 PROCEDURE — 3077F SYST BP >= 140 MM HG: CPT | Mod: CPTII,S$GLB,,

## 2024-05-10 RX ORDER — TRIAMCINOLONE ACETONIDE 40 MG/ML
40 INJECTION, SUSPENSION INTRA-ARTICULAR; INTRAMUSCULAR
Status: COMPLETED | OUTPATIENT
Start: 2024-05-10 | End: 2024-05-10

## 2024-05-10 RX ORDER — TRIAMCINOLONE ACETONIDE 40 MG/ML
40 INJECTION, SUSPENSION INTRA-ARTICULAR; INTRAMUSCULAR
Status: DISCONTINUED | OUTPATIENT
Start: 2024-05-10 | End: 2024-05-10 | Stop reason: HOSPADM

## 2024-05-10 RX ADMIN — TRIAMCINOLONE ACETONIDE 40 MG: 40 INJECTION, SUSPENSION INTRA-ARTICULAR; INTRAMUSCULAR at 08:05

## 2024-05-10 NOTE — LETTER
May 10, 2024      [facility]  [address] [place] [address]  Phone: [phone]  Fax: [phone]       Patient: Pravin Acevedo   YOB: 1965  Date of Visit: 05/10/2024    To Whom It May Concern:    Cam Aceveod  was at [facility] on 05/10/2024. The patient may return to work/school on 05/13/2024 with restrictions: no lifting over 20lbs for 2 weeks.. If you have any questions or concerns, or if I can be of further assistance, please do not hesitate to contact me.    Sincerely,    Shikha Lewis PA-C

## 2024-05-10 NOTE — PROCEDURES
Large Joint Aspiration/Injection: L subacromial bursa    Date/Time: 5/10/2024 8:30 AM    Performed by: Shikha Lewis PA-C  Authorized by: Shikha Lewis PA-C    Consent Done?:  Yes (Verbal)  Indications:  Pain  Site marked: the procedure site was marked    Timeout: prior to procedure the correct patient, procedure, and site was verified    Prep: patient was prepped and draped in usual sterile fashion    Local anesthesia used?: No    Local anesthetic:  Topical anesthetic and bupivacaine 0.25% without epinephrine  Anesthetic total (ml):  4      Details:  Needle Size:  22 G  Ultrasonic Guidance for needle placement?: No    Approach:  Posterior  Location:  Shoulder  Site:  L subacromial bursa  Medications:  40 mg triamcinolone acetonide 40 mg/mL  Patient tolerance:  Patient tolerated the procedure well with no immediate complications     Pediatric Illness Visit     1/8/2024        Roomed by: Magui Donovan CMA 9:23 AM    Accompanied by: Mother  Provides history: Mother    Subjective:  Crescencio is a 17 year old female who is complaining of fever x 5-6 days - 104.6 tmax a couple days ago, trending downward. Also with deep cough and is full of snot, feeling sinus pressure.    Saw Dr. Serna 4 days ago when fever started, negative strep test    Saw Samantha 1.5 weeks ago, no fever but had terrible cough    Whole family with coughs and are all on antibiotics    Past medical history-denies any history of asthma or need for albuterol    Review of Systems   All other systems reviewed and are negative.    Allergies:  ALLERGIES:   Allergen Reactions   • Skin Adhesives RASH and SWELLING     Current Outpatient Medications   Medication Sig Dispense Refill   • methylphenidate (METADATE CD) 30 MG CR capsule Take 1 capsule by mouth every morning. 30 capsule 0   • sertraline (ZOLOFT) 50 MG tablet Take 1.5 tablets by mouth daily. 135 tablet 0   • methylpheniDATE (RITALIN) 10 MG tablet Take 1 tablet by mouth daily as needed (in the afternoon for ADHD). 30 tablet 0   • buPROPion (WELLBUTRIN) 75 MG tablet Take 2 tablets by mouth daily. 180 tablet 0   • omeprazole (PrilOSEC) 20 MG capsule Take 1 capsule by mouth daily. 90 capsule 3   • Multiple Vitamin (MULTIVITAMINS PO)      • vitamin D3 (CHOLECALCIFEROL) 1.25 mg (50,000 units) capsule      • cetirizine (ZyrTEC) 5 MG/5ML solution        No current facility-administered medications for this visit.     Family history:   Family member(s) with similar symptoms    Social history:   Attends school    Objective:  Pulse (!) 113   Temp 99.6 °F (37.6 °C) (Temporal)   Wt (!) 101.5 kg (223 lb 12.3 oz)   LMP 11/12/2023 (Approximate)     Vitals:    01/08/24 0842   Pulse: (!) 113   Temp: 99.6 °F (37.6 °C)   TempSrc: Temporal   SpO2: 92%   Weight: (!) 101.5 kg (223 lb 12.3 oz)   LMP: 11/12/2023     Pulse ox comes up to 94% with  cough      Physical Exam  Constitutional:       Appearance: She is well-developed.      Comments: Breathing comfortably but notes that she feels lightheaded.  Roomer noticed that she seemed to be slightly confused   HENT:      Head: Normocephalic and atraumatic.      Right Ear: Tympanic membrane, ear canal and external ear normal.      Left Ear: Tympanic membrane, ear canal and external ear normal.      Mouth/Throat:      Mouth: Mucous membranes are moist.      Pharynx: No posterior oropharyngeal erythema.      Neck: Normal range of motion and neck supple.   Eyes:      Conjunctiva/sclera: Conjunctivae normal.   Neck:      Comments: No significant lymphadenopathy  Cardiovascular:      Rate and Rhythm: Normal rate.   Pulmonary:      Effort: Pulmonary effort is normal.      Breath sounds: Wheezing, rhonchi and rales present.   Abdominal:      Palpations: Abdomen is soft.      Tenderness: There is no abdominal tenderness.   Skin:     General: Skin is warm.      Comments: No acute rashes   Neurological:      Mental Status: She is alert.         Assessment/Plan:  Upper respiratory infection with low pulse ox and lightheadedness-again confusion noted by staff.  Suspect she is symptomatically hypoxic and in need of higher level of care.  Recommended patient be seen in the emergency room where she can be monitored for a longer period, perhaps get albuterol nebulization and a chest x-ray.  Discussed need to take her directly to the emergency room.  Possibilities include bilateral pneumonia such as from mycoplasma, viral upper respiratory infection with bronchospasm.  Discussed possibility that patient will need to be admitted and [facility] to a [facility] hospital where she can be admitted overnight and there may be a substantial financial charge for the ambulance ride.    Over 30 minutes spent reviewing chart, gathering history, examining patient and charting on the day of the  visit.    Medication changes: No  Immunizations given today? No.  See Orders:  Instructed to call if the problem worsens or does not improve within the next 24 to 48 hours.  Schedule follow-up: PRN      This note may have been created using the Dragon voice recognition system. Errors in content may be related to improper recognition of the system. Effort to review and correct the note has been made but irregularities may still be present.     Note to patient: The 21st Century Cures Act makes medical notes like these available to patients in the interest of transparency. However, be advised this is a medical document. It is intended as peer to peer communication. It is written in medical language and may contain abbreviations or verbiage that are unfamiliar. It may appear blunt or direct. Medical documents are intended to carry relevant information, facts as evident, and the clinical opinion of the practitioner.

## 2024-05-10 NOTE — PROGRESS NOTES
Patient ID: Pravin Acevedo is a 58 y.o. male    Numbness and Pain of the Left Shoulder      History of Present Illness:    Pravin Acevedo presents to clinic for left shoulder pain.  Patient denies known mechanism of injury.  Reports pain for the last few years however this has recently become more constant and bothersome.  He does have known cervical pain which she was being worked up for with Neurosurgery and pain management he is a  and has to push and lift heavy objects.  His pain is mostly anterior, 8/10 aching pain.  He does endorse nighttime pain.  He has tried home exercise program, bike in an extra is with mild improvement.  He had an injection about 8 years ago go to his left shoulder with improvement and is interested in repeating that today.    Occupation:      Ambulating:  Unassisted  Diabetic: no  Smoking: no  Hx of DVT/PE: no    PAST MEDICAL HISTORY: History reviewed. No pertinent past medical history.  PAST SURGICAL HISTORY: History reviewed. No pertinent surgical history.  FAMILY HISTORY:   Family History   Problem Relation Name Age of Onset    No Known Problems Mother          passed in [place]    No Known Problems Father      Heart attack Neg Hx      Stroke Neg Hx      Lung cancer Neg Hx      Colon cancer Neg Hx      Prostate cancer Neg Hx       SOCIAL HISTORY:   Social History     Occupational History    Not on file   Tobacco Use    Smoking status: Never    Smokeless tobacco: Never   Substance and Sexual Activity    Alcohol use: Yes     Alcohol/week: 6.0 standard drinks of alcohol     Types: 6 Cans of beer per week     Comment: 6 beer per weekend    Drug use: Never    Sexual activity: Yes     Partners: Female        MEDICATIONS:   Current Outpatient Medications:     atorvastatin (LIPITOR) 20 MG tablet, Take 1 tablet (20 mg total) by mouth once daily., Disp: 90 tablet, Rfl: 3    gabapentin (NEURONTIN) 300 MG capsule, Take 1 capsule (300 mg total) by mouth  once daily AND 2 capsules (600 mg total) every evening., Disp: 60 capsule, Rfl: 11    meloxicam (MOBIC) 15 MG tablet, Take 1 tablet (15 mg total) by mouth once daily., Disp: 30 tablet, Rfl: 5    traMADoL (ULTRAM) 50 mg tablet, Take 1 tablet (50 mg total) by mouth nightly as needed for Pain., Disp: 30 tablet, Rfl: 0    predniSONE (DELTASONE) 10 MG tablet, 3 tabs (30mg) by mouth for 2 days, 2 tabs (20mg) for 2 days, then 1 tab (10mg) for 3 days. (Patient not taking: Reported on 5/10/2024), Disp: 13 tablet, Rfl: 0  No current facility-administered medications for this visit.  ALLERGIES: Review of patient's allergies indicates:  No Known Allergies      Physical Exam     Vitals:    05/10/24 0800   BP: (!) 143/93   Pulse: 76     Alert and oriented to person, place and time. No acute distress. Well-groomed, not ill appearing. Pupils round and reactive, normal respiratory effort, no audible wheezing.     Shoulder / Upper Extremity Exam    OBSERVATION:     Swelling  none  Deformity  none   Discoloration  none   Scapular winging none   Scars   none  Atrophy  none    TENDERNESS                Clavicle   Negative         AC Jt.    Negative        SC Jt.    Negative          Acromion:  Negative        Scapular Spine +   Supraspinatus  +       Infraspinatus  Negative   LH Biceps   Negative   Greater Tub.  +   Trapezius  Negative   Cervical spine  Negative        ROM: (* = with pain)              FE    170° *      ER at 0°    60°   *     ER at 90° ABD  40° *      IR at 90°  ABD   NA         IR (spine level)   T10         STRENGTH: (* = with pain)    SCAPTION   4/5        IR    4/5       ER    5/5       BICEPS   5/5       Deltoid    5/5         SIGNS:  Painful side       NEER   +   OTONYS  neg    PAGE   +   SPEEDS  neg     DROP ARM   neg   BELLY PRESS neg   Superior escape none    LIFT-OFF  neg   X-Body ADD    neg    MOVING VALGUS neg           Imaging:     Bilateral shoulder X-rays ordered/reviewed by me showing no  evidence of fracture or dislocation. There is no obvious malalignment. No evidence of masses, lesions or foreign bodies.  Mild degenerative changes bilaterally.      Assessment & Plan    Chronic left shoulder pain  -     Ambulatory referral/consult to Orthopedics  -     triamcinolone acetonide injection 40 mg  -     Large Joint Aspiration/Injection: L subacromial bursa         I made the decision to obtain old records of the patient including previous notes and imaging. New imaging was ordered today of the extremity or extremities evaluated. I independently reviewed and interpreted the radiographs and/or MRIs/CT scan today as well as prior imaging.    We discussed at length different treatment options including conservative vs surgical management. These include anti-inflammatories, acetaminophen, rest, ice, heat, formal physical therapy including strengthening and stretching exercises, home exercise programs, injections, dry needling, and finally surgical intervention.      Patient here today for chronic left shoulder pain.  We discussed this is likely rotator cuff dysfunction.  This can cause worsened pain in the setting of cervical radiculopathy as well, we also discussed these conditions tend to go hand in hand.  Patient would like to proceed with right shoulder injection today.    Right shoulder CSI given, post-injection instructions reviewed  Instructed patient to hold on prednisone to see how injection works  Continue HEP  Okay to take Mobic, take with food, avoid other anti-inflammatories.  Ice compress to the affected area 2-3x a day for 15-20 minutes as needed for pain management  Consider MRI if pain is refractory to conservative treatment    Follow up:  If symptoms worsen or fail to improve  X-rays next visit:  None    All questions were answered and patient is agreeable to the above plan.

## 2024-05-12 PROBLEM — R73.03 PREDIABETES: Status: ACTIVE | Noted: 2024-05-12

## 2024-05-30 ENCOUNTER — OFFICE VISIT (OUTPATIENT)
Dept: PAIN MEDICINE | Facility: CLINIC | Age: 59
End: 2024-05-30
Payer: COMMERCIAL

## 2024-05-30 VITALS
BODY MASS INDEX: 41.6 KG/M2 | WEIGHT: 257.69 LBS | SYSTOLIC BLOOD PRESSURE: 146 MMHG | DIASTOLIC BLOOD PRESSURE: 87 MMHG | HEART RATE: 93 BPM

## 2024-05-30 DIAGNOSIS — E66.01 OBESITY, MORBID, BMI 40.0-49.9: ICD-10-CM

## 2024-05-30 DIAGNOSIS — M48.02 FORAMINAL STENOSIS OF CERVICAL REGION: ICD-10-CM

## 2024-05-30 DIAGNOSIS — M67.912 DYSFUNCTION OF LEFT ROTATOR CUFF: Primary | ICD-10-CM

## 2024-05-30 DIAGNOSIS — M54.12 CERVICAL RADICULOPATHY: ICD-10-CM

## 2024-05-30 DIAGNOSIS — M47.812 CERVICAL SPONDYLOSIS: ICD-10-CM

## 2024-05-30 DIAGNOSIS — M50.30 DDD (DEGENERATIVE DISC DISEASE), CERVICAL: ICD-10-CM

## 2024-05-30 PROCEDURE — 3077F SYST BP >= 140 MM HG: CPT | Mod: CPTII,S$GLB,, | Performed by: PAIN MEDICINE

## 2024-05-30 PROCEDURE — 99214 OFFICE O/P EST MOD 30 MIN: CPT | Mod: S$GLB,,, | Performed by: PAIN MEDICINE

## 2024-05-30 PROCEDURE — 3044F HG A1C LEVEL LT 7.0%: CPT | Mod: CPTII,S$GLB,, | Performed by: PAIN MEDICINE

## 2024-05-30 PROCEDURE — 1160F RVW MEDS BY RX/DR IN RCRD: CPT | Mod: CPTII,S$GLB,, | Performed by: PAIN MEDICINE

## 2024-05-30 PROCEDURE — 99999 PR PBB SHADOW E&M-EST. PATIENT-LVL IV: CPT | Mod: PBBFAC,,, | Performed by: PAIN MEDICINE

## 2024-05-30 PROCEDURE — 1159F MED LIST DOCD IN RCRD: CPT | Mod: CPTII,S$GLB,, | Performed by: PAIN MEDICINE

## 2024-05-30 PROCEDURE — 3079F DIAST BP 80-89 MM HG: CPT | Mod: CPTII,S$GLB,, | Performed by: PAIN MEDICINE

## 2024-05-30 PROCEDURE — 3008F BODY MASS INDEX DOCD: CPT | Mod: CPTII,S$GLB,, | Performed by: PAIN MEDICINE

## 2024-05-30 RX ORDER — GABAPENTIN 300 MG/1
CAPSULE ORAL
Qty: 60 CAPSULE | Refills: 11 | Status: CANCELLED | OUTPATIENT
Start: 2024-05-30 | End: 2025-05-30

## 2024-05-30 NOTE — TELEPHONE ENCOUNTER
Spoke with patient. Stated he is running low on the gabapentin. Informed he has refills available through the end of the year. Verbalized understanding and will contact the pharmacy. No further issues discussed.

## 2024-05-30 NOTE — PROGRESS NOTES
Subjective:     Patient ID: Pravin Acevedo is a 58 y.o. male    Chief Complaint: Neck Pain      Referred by: No ref. provider found      HPI:    Interval History (5/30/24):  He returns today for follow up.  He reports that he underwent left subacromial bursa steroid injection on 5/10/24.  He states this has provided significant relief of his shoulder pain.  He still has some issues, but overall doing much better.  He has not done physical therapy for his left shoulder.  He states he is continues to have some neck pain, particularly when he sleeps.  Also reports that he has some numbness of the arm when he sleeps on his left side.  He also reports intermittent numbness of the left index finger and thumb.  He has not very eager to undergo additional injections at this time.  He is willing to consider cervical epidural steroid injection in the future if needed.      Initial Encounter (11/16/23):  Pravin Acevedo is a 58 y.o. male who presents today with chronic left-sided neck, upper back, left upper extremity pain/numbness.  This pain has been present for roughly 6 years.  No specific inciting events or injuries noted.  The pain is located the left lower cervical/upper thoracic paraspinal region radiates the left upper trapezius.  Pain will also extend into the left upper extremity consistent with a C6 dermatome associated paresthesias in the thumb and index finger.  Denies any focal weakness or bowel bladder dysfunction.  Pain is constant and worsened with activity.   This pain is described in detail below.    Physical Therapy:  Yes for his neck.  Completed physical therapy earlier this year and performs daily home exercise program.    Non-pharmacologic Treatment:  Rest helps         TENS?  No    Pain Medications:         Currently taking:  Gabapentin, meloxicam    Has tried in the past:  NSAIDs, Tylenol, tramadol    Has not tried:  Muscle relaxants, TCAs, SNRIs, anticonvulsants, topical creams    Blood thinners:   None    Interventional Therapies:    Patient reports 1 previous injection in his neck.  Specific details regarding this injection are not available.    Relevant Surgeries:  None    Affecting sleep?  Yes    Affecting daily activities? yes    Depressive symptoms? No          SI/HI? No    Work status: Employed    Pain Scores:    Best:       0/10  Worst:     10/10  Usually:   7/10  Today:    6/10    Pain Disability Index  Family/Home Responsibilities:: 8  Recreation:: 8  Social Activity:: 8  Occupation:: 8  Sexual Behavior:: 8  Self Care:: 8  Life-Support Activities:: 8  Pain Disability Index (PDI): 56    Review of Systems   Constitutional:  Negative for activity change, appetite change, chills, fatigue, fever and unexpected weight change.   HENT:  Negative for hearing loss.    Eyes:  Negative for visual disturbance.   Respiratory:  Negative for chest tightness and shortness of breath.    Cardiovascular:  Negative for chest pain.   Gastrointestinal:  Negative for abdominal pain, constipation, diarrhea, nausea and vomiting.   Genitourinary:  Negative for difficulty urinating.   Musculoskeletal:  Positive for arthralgias, myalgias, neck pain and neck stiffness. Negative for back pain and gait problem.   Skin:  Negative for rash.   Neurological:  Positive for numbness. Negative for dizziness, weakness, light-headedness and headaches.   Psychiatric/Behavioral:  Positive for sleep disturbance. Negative for hallucinations and suicidal ideas. The patient is not nervous/anxious.        History reviewed. No pertinent past medical history.    History reviewed. No pertinent surgical history.    Social History     Socioeconomic History    Marital status:    Tobacco Use    Smoking status: Never    Smokeless tobacco: Never   Substance and Sexual Activity    Alcohol use: Yes     Alcohol/week: 6.0 standard drinks of alcohol     Types: 6 Cans of beer per week     Comment: 6 beer per weekend    Drug use: Never    Sexual activity:  Yes     Partners: Female       Review of patient's allergies indicates:  No Known Allergies    Current Outpatient Medications on File Prior to Visit   Medication Sig Dispense Refill    atorvastatin (LIPITOR) 20 MG tablet Take 1 tablet (20 mg total) by mouth once daily. 90 tablet 3    gabapentin (NEURONTIN) 300 MG capsule Take 1 capsule (300 mg total) by mouth once daily AND 2 capsules (600 mg total) every evening. 60 capsule 11    meloxicam (MOBIC) 15 MG tablet Take 1 tablet (15 mg total) by mouth once daily. 30 tablet 5    traMADoL (ULTRAM) 50 mg tablet Take 1 tablet (50 mg total) by mouth nightly as needed for Pain. 30 tablet 0    [DISCONTINUED] predniSONE (DELTASONE) 10 MG tablet 3 tabs (30mg) by mouth for 2 days, 2 tabs (20mg) for 2 days, then 1 tab (10mg) for 3 days. (Patient not taking: Reported on 5/10/2024) 13 tablet 0     No current facility-administered medications on file prior to visit.       Objective:      BP (!) 146/87   Pulse 93   Wt 116.9 kg (257 lb 11.5 oz)   BMI 41.60 kg/m²     Exam:  GEN:  Well developed, well nourished.  No acute distress.   HEENT:  No trauma.  Mucous membranes moist.  Nares patent bilaterally.  PSYCH: Normal affect. Thought content appropriate.  CHEST:  Breathing symmetric.  No audible wheezing.  ABD: Soft, non-distended.  SKIN:  Warm, pink, dry.  No rash on exposed areas.    EXT:  No cyanosis, clubbing, or edema.  No color change or changes in nail or hair growth.  NEURO/MUSCULOSKELETAL:  Fully alert, oriented, and appropriate. Speech normal jim. No cranial nerve deficits.   Gait:  Normal.  No focal motor deficits.         Imaging:    Cervical MRI report from March of 2022:    C3-4: Bilateral hypertrophic foraminal stenosis.  Probable bilateral C4 nerve root impingement.    C4-5: Right hypertrophic foraminal stenosis.  Probable right C5 nerve root impingement.  C5-6:  Bilateral hypertrophic foraminal stenosis.  Probable bilateral C6 nerve root impingement.  C6-7:   Central left lateral disc herniation.  Bilateral foraminal stenosis.  Probable bilateral C7 nerve root impingement.      Assessment:       Encounter Diagnoses   Name Primary?    Dysfunction of left rotator cuff Yes    Cervical spondylosis     DDD (degenerative disc disease), cervical     Cervical radiculopathy        Plan:       Pravin was seen today for neck pain.    Diagnoses and all orders for this visit:    Dysfunction of left rotator cuff  -     Ambulatory referral/consult to Physical/Occupational Therapy; Future    Cervical spondylosis    DDD (degenerative disc disease), cervical    Cervical radiculopathy        Pravin Acevedo is a 58 y.o. male with left-sided neck and upper extremity pain.  Most consistent with a left C6 radiculopathy.  Does have decreased sensation consistent with this diagnosis.  No overt weakness. Cervical MRI report does indicate multilevel degenerative disc disease/facet hypertrophy causing multilevel bilateral foraminal stenosis/nerve root impingement.  Patient reports significant improvement following left subacromial bursa steroid injection done with surgery.  As result, it is questionable how much of his symptoms are related to his shoulder though this would not cause numbness and tingling or other radicular symptoms.    Pertinent imaging studies reviewed by me. Imaging results were discussed with patient.  Advised patient to bring imaging disc any follow-up visits that images can be directly reviewed.  Continue gabapentin and meloxicam.    We discussed potentially performing cervical epidural steroid injection.  Patient states he would prefer to avoid all injections until absolutely necessary.  Physical therapy referral for left shoulder pain/rotator cuff dysfunction.    Can follow up with Orthopedic surgery for additional shoulder injections if needed.    Return to clinic in 6 weeks or sooner if needed.  At that time we will discuss efficacy of physical therapy/home exercise  program.  May consider C7-T1 interlaminar epidural steroid injection if pain persists or worsens.          This note was created by combination of typed  and M-Modal dictation. Transcription and phonetic errors may be present.  If there are any questions, please contact me.

## 2024-06-17 ENCOUNTER — OFFICE VISIT (OUTPATIENT)
Dept: NEUROSURGERY | Facility: CLINIC | Age: 59
End: 2024-06-17
Payer: COMMERCIAL

## 2024-06-17 VITALS — DIASTOLIC BLOOD PRESSURE: 100 MMHG | HEART RATE: 71 BPM | SYSTOLIC BLOOD PRESSURE: 164 MMHG

## 2024-06-17 DIAGNOSIS — M48.02 SPINAL STENOSIS, CERVICAL REGION: Primary | ICD-10-CM

## 2024-06-17 PROCEDURE — 1159F MED LIST DOCD IN RCRD: CPT | Mod: CPTII,S$GLB,, | Performed by: STUDENT IN AN ORGANIZED HEALTH CARE EDUCATION/TRAINING PROGRAM

## 2024-06-17 PROCEDURE — 3044F HG A1C LEVEL LT 7.0%: CPT | Mod: CPTII,S$GLB,, | Performed by: STUDENT IN AN ORGANIZED HEALTH CARE EDUCATION/TRAINING PROGRAM

## 2024-06-17 PROCEDURE — 99214 OFFICE O/P EST MOD 30 MIN: CPT | Mod: S$GLB,,, | Performed by: STUDENT IN AN ORGANIZED HEALTH CARE EDUCATION/TRAINING PROGRAM

## 2024-06-17 PROCEDURE — 3077F SYST BP >= 140 MM HG: CPT | Mod: CPTII,S$GLB,, | Performed by: STUDENT IN AN ORGANIZED HEALTH CARE EDUCATION/TRAINING PROGRAM

## 2024-06-17 PROCEDURE — 3080F DIAST BP >= 90 MM HG: CPT | Mod: CPTII,S$GLB,, | Performed by: STUDENT IN AN ORGANIZED HEALTH CARE EDUCATION/TRAINING PROGRAM

## 2024-06-17 NOTE — PROGRESS NOTES
Neurosurgery  Established Patient    SUBJECTIVE:     History of Present Illness:  Pravin Acevedo is a 58 y.o. male being seen in clinic today to discuss concerns with chronic neck and left shoulder/arm pain. States that he has obtained an injection in the past with mild relief and continues to take gabapentin, Tramadol, and Meloxicam as needed with relief. He is apprehensive about surgery but concerned about the progression of his pain and paraesthesias. Describes the pain as aching down the left-side of the neck extending into the shoulder and down into the 1st and 2nd digits. Denies right-sided pain. Rates the pain as a 7/10. Aggravating factors include movement and lifting objects. Alleviating factors include rest and medications. Denies b/b dysfunction, saddle anesthesia, or gait instability. Endorses weakness in the hand when the pain is severe. Denies frequent dropping of objects.      Interval fu 6/17/24:  Pt presents in fu.  He has undergone left subacromial bursa injection which has helped improve his left shoulder pain.  He feels that his LUE radicular pain has also improved mildly.  He still has parasthesias in the first 2 digits of his left hand.  Shaking out his left hand can alleviated some of the numbness/tingling.  Raising his left arm over his head used to improve the LUE radciulopathy as well.  He denies symptoms of myelopathy.  He is a nonsmoker.  He hasn't started PT or done injections for his cervical spine.    Review of patient's allergies indicates:  No Known Allergies    Current Outpatient Medications   Medication Sig Dispense Refill    atorvastatin (LIPITOR) 20 MG tablet Take 1 tablet (20 mg total) by mouth once daily. 90 tablet 3    gabapentin (NEURONTIN) 300 MG capsule Take 1 capsule (300 mg total) by mouth once daily AND 2 capsules (600 mg total) every evening. 60 capsule 11    meloxicam (MOBIC) 15 MG tablet Take 1 tablet (15 mg total) by mouth once daily. 30 tablet 5    traMADoL  (ULTRAM) 50 mg tablet Take 1 tablet (50 mg total) by mouth nightly as needed for Pain. 30 tablet 0     No current facility-administered medications for this visit.       No past medical history on file.  No past surgical history on file.  Family History       Problem Relation (Age of Onset)    No Known Problems Mother, Father          Social History     Socioeconomic History    Marital status:    Tobacco Use    Smoking status: Never    Smokeless tobacco: Never   Substance and Sexual Activity    Alcohol use: Yes     Alcohol/week: 6.0 standard drinks of alcohol     Types: 6 Cans of beer per week     Comment: 6 beer per weekend    Drug use: Never    Sexual activity: Yes     Partners: Female       Review of Systems  14 point ROS was negative    OBJECTIVE:     Vital Signs  Pulse: 71  BP: (!) 164/100  Pain Score:   3  There is no height or weight on file to calculate BMI.    Physical Exam:    Constitutional: He appears well-developed and well-nourished.     Eyes: Pupils are equal, round, and reactive to light.     Cardiovascular: Normal rate and regular rhythm.     Abdominal: Soft.     Psych/Behavior: He is alert. He is oriented to person, place, and time. He has a normal mood and affect.     Musculoskeletal: Gait is normal.        Neck: Range of motion is limited.        Back: Range of motion is limited.        Right Upper Extremities: Muscle strength is 5/5.        Left Upper Extremities: Muscle strength is 5/5.       Right Lower Extremities: Muscle strength is 5/5.        Left Lower Extremities: Muscle strength is 5/5.     Neurological:        Coordination: He has a normal Romberg Test and normal tandem walking coordination.        Sensory: There is no sensory deficit in the trunk. There is no sensory deficit in the extremities.        DTRs: DTRs are DTRS NORMAL AND SYMMETRICnormal and symmetric.        Cranial nerves: Cranial nerve(s) II, III, IV, V, VI, VII, VIII, IX, X, XI and XII are intact.     No  vieira's  Negative tinel's at left wrist.  +phalen's/reverse phalen's left   Negative spurling left    Diagnostic Results:  Flex/ex c: no instability  CT c spine: no opll  MRI c spine: multilevel degenerative changes worse at C5-7 causing left greater than right severe foraminal stenosis.  MRI L spine: epidural lipomatosis causing moderate to severe stenosis from L4-S1.  Reviewed    ASSESSMENT/PLAN:     59 M presents for eval of left C6 radiculopathy.  He is intact on exam.  His imaging is described above.  Clinically he has a component of possible left CTS so will plan to get EMG of LUE.  Pt is to start PT this Thursday.  Fu once PT/EMG competed.

## 2024-07-30 ENCOUNTER — OFFICE VISIT (OUTPATIENT)
Dept: PAIN MEDICINE | Facility: CLINIC | Age: 59
End: 2024-07-30
Payer: COMMERCIAL

## 2024-07-30 VITALS
DIASTOLIC BLOOD PRESSURE: 69 MMHG | BODY MASS INDEX: 42.34 KG/M2 | WEIGHT: 262.38 LBS | SYSTOLIC BLOOD PRESSURE: 127 MMHG | HEART RATE: 81 BPM

## 2024-07-30 DIAGNOSIS — M47.812 CERVICAL SPONDYLOSIS: ICD-10-CM

## 2024-07-30 DIAGNOSIS — M54.12 CERVICAL RADICULOPATHY: ICD-10-CM

## 2024-07-30 DIAGNOSIS — M67.912 DYSFUNCTION OF LEFT ROTATOR CUFF: ICD-10-CM

## 2024-07-30 DIAGNOSIS — M50.30 DDD (DEGENERATIVE DISC DISEASE), CERVICAL: Primary | ICD-10-CM

## 2024-07-30 PROCEDURE — 3078F DIAST BP <80 MM HG: CPT | Mod: CPTII,S$GLB,,

## 2024-07-30 PROCEDURE — 3008F BODY MASS INDEX DOCD: CPT | Mod: CPTII,S$GLB,,

## 2024-07-30 PROCEDURE — 99213 OFFICE O/P EST LOW 20 MIN: CPT | Mod: S$GLB,,,

## 2024-07-30 PROCEDURE — 3044F HG A1C LEVEL LT 7.0%: CPT | Mod: CPTII,S$GLB,,

## 2024-07-30 PROCEDURE — 1159F MED LIST DOCD IN RCRD: CPT | Mod: CPTII,S$GLB,,

## 2024-07-30 PROCEDURE — 1160F RVW MEDS BY RX/DR IN RCRD: CPT | Mod: CPTII,S$GLB,,

## 2024-07-30 PROCEDURE — 3074F SYST BP LT 130 MM HG: CPT | Mod: CPTII,S$GLB,,

## 2024-07-30 PROCEDURE — 99999 PR PBB SHADOW E&M-EST. PATIENT-LVL III: CPT | Mod: PBBFAC,,,

## 2024-07-30 NOTE — PROGRESS NOTES
Subjective:     Patient ID: Pravin Acevedo is a 59 y.o. male    Chief Complaint: Follow-up (8 week)      Referred by: No ref. provider found      HPI:    Interval History PA (07/30/2024):  Patient returns to clinic for follow up of multiple pain complaints.  Patient notes overall doing well since previous encounter.  Notes benefit with continued physical therapy.  Reports that he has recently started physical therapy, attended about 3-4 sessions so far with benefit.  States he feels his pain has been improving, typically at a 4-5/10 and tolerable.  He does report some increased episodic pain with certain activities but these typically resolved with rest.  He does also note significant shoulder pain which has improved following left subacromial bursa steroid injection performed in May.  Does note some slight return of this pain and plans on following up with Orthopedics in the next month or so for potential repeat injection.  He denies any weakness, bowel or bladder dysfunction.    Interval History (5/30/24):  He returns today for follow up.  He reports that he underwent left subacromial bursa steroid injection on 5/10/24.  He states this has provided significant relief of his shoulder pain.  He still has some issues, but overall doing much better.  He has not done physical therapy for his left shoulder.  He states he is continues to have some neck pain, particularly when he sleeps.  Also reports that he has some numbness of the arm when he sleeps on his left side.  He also reports intermittent numbness of the left index finger and thumb.  He has not very eager to undergo additional injections at this time.  He is willing to consider cervical epidural steroid injection in the future if needed.      Initial Encounter (11/16/23):  Pravin Acevedo is a 59 y.o. male who presents today with chronic left-sided neck, upper back, left upper extremity pain/numbness.  This pain has been present for roughly 6 years.  No  specific inciting events or injuries noted.  The pain is located the left lower cervical/upper thoracic paraspinal region radiates the left upper trapezius.  Pain will also extend into the left upper extremity consistent with a C6 dermatome associated paresthesias in the thumb and index finger.  Denies any focal weakness or bowel bladder dysfunction.  Pain is constant and worsened with activity.   This pain is described in detail below.    Physical Therapy:  Yes for his neck.  Completed physical therapy earlier this year and performs daily home exercise program.    Non-pharmacologic Treatment:  Rest helps         TENS?  No    Pain Medications:         Currently taking:  Gabapentin, meloxicam    Has tried in the past:  NSAIDs, Tylenol, tramadol    Has not tried:  Muscle relaxants, TCAs, SNRIs, anticonvulsants, topical creams    Blood thinners:  None    Interventional Therapies:    Patient reports 1 previous injection in his neck.  Specific details regarding this injection are not available.    Relevant Surgeries:  None    Affecting sleep?  Yes    Affecting daily activities? yes    Depressive symptoms? No          SI/HI? No    Work status: Employed    Pain Scores:    Best:       0/10  Worst:     10/10  Usually:   7/10  Today:    6/10         Review of Systems   Constitutional:  Negative for activity change, appetite change, chills, fatigue, fever and unexpected weight change.   HENT:  Negative for hearing loss.    Eyes:  Negative for visual disturbance.   Respiratory:  Negative for chest tightness and shortness of breath.    Cardiovascular:  Negative for chest pain.   Gastrointestinal:  Negative for abdominal pain, constipation, diarrhea, nausea and vomiting.   Genitourinary:  Negative for difficulty urinating.   Musculoskeletal:  Positive for arthralgias, myalgias, neck pain and neck stiffness. Negative for back pain and gait problem.   Skin:  Negative for rash.   Neurological:  Positive for numbness. Negative for  dizziness, weakness, light-headedness and headaches.   Psychiatric/Behavioral:  Positive for sleep disturbance. Negative for hallucinations and suicidal ideas. The patient is not nervous/anxious.        No past medical history on file.    No past surgical history on file.    Social History     Socioeconomic History    Marital status:    Tobacco Use    Smoking status: Never    Smokeless tobacco: Never   Substance and Sexual Activity    Alcohol use: Yes     Alcohol/week: 6.0 standard drinks of alcohol     Types: 6 Cans of beer per week     Comment: 6 beer per weekend    Drug use: Never    Sexual activity: Yes     Partners: Female       Review of patient's allergies indicates:  No Known Allergies    Current Outpatient Medications on File Prior to Visit   Medication Sig Dispense Refill    atorvastatin (LIPITOR) 20 MG tablet Take 1 tablet (20 mg total) by mouth once daily. 90 tablet 3    gabapentin (NEURONTIN) 300 MG capsule Take 1 capsule (300 mg total) by mouth once daily AND 2 capsules (600 mg total) every evening. 60 capsule 11    meloxicam (MOBIC) 15 MG tablet Take 1 tablet (15 mg total) by mouth once daily. 30 tablet 5    traMADoL (ULTRAM) 50 mg tablet Take 1 tablet (50 mg total) by mouth nightly as needed for Pain. 30 tablet 0     No current facility-administered medications on file prior to visit.       Objective:      /69   Pulse 81   Wt 119 kg (262 lb 5.6 oz)   BMI 42.34 kg/m²     Exam:  GEN:  Well developed, well nourished.  No acute distress.   HEENT:  No trauma.  Mucous membranes moist.  Nares patent bilaterally.  PSYCH: Normal affect. Thought content appropriate.  CHEST:  Breathing symmetric.  No audible wheezing.  ABD: Soft, non-distended.  SKIN:  Warm, pink, dry.  No rash on exposed areas.    EXT:  No cyanosis, clubbing, or edema.  No color change or changes in nail or hair growth.  NEURO/MUSCULOSKELETAL:  Fully alert, oriented, and appropriate. Speech normal jim. No cranial nerve  deficits.   Gait:  Normal.  No focal motor deficits.         Imaging:    Cervical MRI report from March of 2022:    C3-4: Bilateral hypertrophic foraminal stenosis.  Probable bilateral C4 nerve root impingement.    C4-5: Right hypertrophic foraminal stenosis.  Probable right C5 nerve root impingement.  C5-6:  Bilateral hypertrophic foraminal stenosis.  Probable bilateral C6 nerve root impingement.  C6-7:  Central left lateral disc herniation.  Bilateral foraminal stenosis.  Probable bilateral C7 nerve root impingement.      Assessment:       No diagnosis found.      Plan:       Pravin was seen today for follow-up.    Diagnoses and all orders for this visit:    DDD (degenerative disc disease), cervical    Dysfunction of left rotator cuff    Cervical spondylosis    Cervical radiculopathy          Pravin Acevedo is a 59 y.o. male with left-sided neck and upper extremity pain.  Most consistent with a left C6 radiculopathy.  Does have decreased sensation consistent with this diagnosis.  No overt weakness. Cervical MRI report does indicate multilevel degenerative disc disease/facet hypertrophy causing multilevel bilateral foraminal stenosis/nerve root impingement.  Patient reports significant improvement following left subacromial bursa steroid injection done with surgery.  As result, it is questionable how much of his symptoms are related to his shoulder though this would not cause numbness and tingling or other radicular symptoms.    Pertinent imaging studies reviewed by me. Imaging results were discussed with patient.  Advised patient to bring imaging disc any follow-up visits that images can be directly reviewed.  Continue gabapentin and meloxicam.    We discussed potentially performing cervical epidural steroid injection.  Patient deferred at this time as he would like to continue with more conservative measures including physical therapy.  Continue with physical therapy and home exercise program.  Can follow up  with Orthopedic surgery for additional shoulder injections if needed.    Return to clinic as needed.          This note was created by combination of typed  and M-Modal dictation. Transcription and phonetic errors may be present.  If there are any questions, please contact me.

## 2024-08-13 ENCOUNTER — PROCEDURE VISIT (OUTPATIENT)
Dept: NEUROLOGY | Facility: CLINIC | Age: 59
End: 2024-08-13
Payer: COMMERCIAL

## 2024-08-13 DIAGNOSIS — M48.02 SPINAL STENOSIS, CERVICAL REGION: ICD-10-CM

## 2024-08-13 PROCEDURE — 95911 NRV CNDJ TEST 9-10 STUDIES: CPT | Mod: S$GLB,,, | Performed by: PHYSICAL MEDICINE & REHABILITATION

## 2024-08-13 PROCEDURE — 95886 MUSC TEST DONE W/N TEST COMP: CPT | Mod: S$GLB,,, | Performed by: PHYSICAL MEDICINE & REHABILITATION

## 2024-08-13 NOTE — PROCEDURES
Test Date:  2024    Patient: pravin acevedo : 1965 Physician: Vishal Aragon D.O.   ID#: [id number] SEX: Male Ref. Phys: Vishal Almonte DO     HPI: Pravin Acevedo is a 59 y.o.male who presents for NCS/EMG to evaluate for left carpal tunnel vs cervical radiculopathy.    PROCEDURE:  Prior to the procedure, the procedure was discussed in detail with the patient.  All questions were answered, and verbal consent was obtained.  For nerve conduction studies, a combination of surface electrodes, bar electrodes, and/or ring electrodes were used as needed.  For needle EMG, each site was cleaned and prepped in usual fashion with an alcohol pad.  A monopolar needle (28G) was used.  There was no significant bleeding, and bandages were applied as needed.  The procedure was tolerated without adverse effect.  The patient was instructed on post-procedure care including ice if needed for 10-15 minutes up to 4 times/day for any sore muscles.  I discussed with the patient that the data would be reviewed and a report sent to the referring provider, where any follow up questions regarding next steps should be directed.        NCV & EMG Findings:  All nerve conduction studies (as indicated in the following tables) were within normal limits.  Needle evaluation of the left Cervical Paraspinals (Lower) muscle showed increased insertional activity and moderately increased spontaneous activity.  All remaining muscles (as indicated in the following table) showed no evidence of electrical instability.      IMPRESSIONS:  The left lower cervical paraspinals showed signs of active and chronic denervation.  When isolated to the paraspinals (and with no prior history of posterior cervical surgery), this can be seen with either isolated dorsal ramus irritation or from a radiculopathy of undetermined level.              ___________________________  Vishal Aragon D.O.        NCS+  Motor Nerve Results      Latency Amplitude  F-Lat Segment Distance CV Comment   Site (ms) Norm (mV) Norm (ms)  (cm) (m/s) Norm    Left Median (APB)   Wrist 3.6  < 4.7 8.7  > 4.2         Elbow 8.5 - 3.9 -  Elbow-Wrist 28 57  > 47    Right Median (APB)   Wrist 3.7  < 4.7 5.4  > 4.2         Elbow 8.8 - 5.5 -  Elbow-Wrist 26 51  > 47    Left Ulnar (ADM)   Wrist 2.8  < 3.7 6.9  > 3.0         Bel Elbow 6.9 - 7.5 -  Bel Elbow-Wrist 25 61  > 52    Right Ulnar (ADM)   Wrist 2.8  < 3.7 9.2  > 3.0         Bel Elbow 7.4 - 8.6 -  Bel Elbow-Wrist 27 59  > 52      Sensory Nerve Results      Start Lat Latency (Peak) Amplitude (P-P) Segment Distance Start CV Comment   Site (ms) Norm (ms) (µV) Norm  (cm) (m/s) Norm    Left Median   Wrist-Dig I 2.4 - 2.8 40 - Wrist-Dig I 10 42 -    Wrist-Dig II 2.8  < 3.3 3.7 35  > 8 Wrist-Dig II 14 50  > 42    Palm-Wrist 1.40 - 1.90 19 - Palm-Wrist - - -    Right Median (Rec:Dig II)   Wrist 3.2  < 3.3 3.9 24  > 8 Wrist-Dig II 14 44  > 42    Left Ulnar (Rec:Wrist)   Palm 1.40 - 2.0 15 - Palm-Wrist - - -    Right Ulnar (Rec:Dig V)   Wrist 3.0  < 3.1 3.7 36  > 4 Wrist-Dig V 14 47  > 45    Left Radial (Rec:Dig I)   Wrist 2.3 - 2.9 11 - Wrist-Dig I 10 43 -      Inter-Nerve Comparisons     Nerve 1 Value 1 Nerve 2 Value 2 Parameter Result Normal   Sensory Sites   L Median Wrist-Dig I 2.8 ms L Radial Wrist-Dig I 2.9 ms Peak Lat Diff 0.10 ms <0.40   L Median Palm-Wrist 1.90 ms L Ulnar Palm-Wrist 2.0 ms Peak Lat Diff 0.10 ms <0.30     EMG+     Side Muscle Nerve Root Ins Act Fibs Psw Amp Dur Poly Recrt Int Pat Comment   Left Deltoid Axillary C5-C6 Nml Nml Nml Nml Nml 0 Nml Nml    Left Biceps Musculocut C5-C6 Nml Nml Nml Nml Nml 0 Nml Nml    Left Triceps Radial C6-C8 Nml Nml Nml Nml Nml 0 Nml Nml    Left Pronator Teres Median C6-C7 Nml Nml Nml Nml Nml 0 Nml Nml    Left FDI Ulnar C8-T1 Nml Nml Nml Nml Nml 0 Nml Nml    Left Cervical Parasp (Lower) Rami C7-C8 *Incr *2+ *2+      +CRD           Waveforms:    Motor              Sensory

## 2024-08-15 ENCOUNTER — OFFICE VISIT (OUTPATIENT)
Dept: ORTHOPEDICS | Facility: CLINIC | Age: 59
End: 2024-08-15
Payer: COMMERCIAL

## 2024-08-15 VITALS
HEART RATE: 90 BPM | SYSTOLIC BLOOD PRESSURE: 150 MMHG | HEIGHT: 66 IN | DIASTOLIC BLOOD PRESSURE: 91 MMHG | BODY MASS INDEX: 42.17 KG/M2 | WEIGHT: 262.38 LBS

## 2024-08-15 DIAGNOSIS — G89.29 CHRONIC LEFT SHOULDER PAIN: Primary | ICD-10-CM

## 2024-08-15 DIAGNOSIS — M25.512 CHRONIC LEFT SHOULDER PAIN: Primary | ICD-10-CM

## 2024-08-15 PROCEDURE — 99999 PR PBB SHADOW E&M-EST. PATIENT-LVL III: CPT | Mod: PBBFAC,,,

## 2024-08-15 RX ORDER — TRIAMCINOLONE ACETONIDE 40 MG/ML
40 INJECTION, SUSPENSION INTRA-ARTICULAR; INTRAMUSCULAR
Status: COMPLETED | OUTPATIENT
Start: 2024-08-15 | End: 2024-08-15

## 2024-08-15 RX ORDER — TRIAMCINOLONE ACETONIDE 40 MG/ML
40 INJECTION, SUSPENSION INTRA-ARTICULAR; INTRAMUSCULAR
Status: DISCONTINUED | OUTPATIENT
Start: 2024-08-15 | End: 2024-08-15 | Stop reason: HOSPADM

## 2024-08-15 RX ADMIN — TRIAMCINOLONE ACETONIDE 40 MG: 40 INJECTION, SUSPENSION INTRA-ARTICULAR; INTRAMUSCULAR at 09:08

## 2024-08-15 NOTE — PROCEDURES
Large Joint Aspiration/Injection: L subacromial bursa    Date/Time: 8/15/2024 9:30 AM    Performed by: Shikha Lewis PA-C  Authorized by: Shikha Lewis PA-C    Consent Done?:  Yes (Verbal)  Indications:  Pain  Site marked: the procedure site was marked    Timeout: prior to procedure the correct patient, procedure, and site was verified    Prep: patient was prepped and draped in usual sterile fashion    Local anesthesia used?: No      Details:  Needle Size:  22 G  Ultrasonic Guidance for needle placement?: No    Approach:  Posterior  Location:  Shoulder  Site:  L subacromial bursa  Medications:  40 mg triamcinolone acetonide 40 mg/mL  Patient tolerance:  Patient tolerated the procedure well with no immediate complications

## 2024-08-15 NOTE — PROGRESS NOTES
Patient ID: Pravin Acevedo is a 59 y.o. male    Numbness and Pain of the Left Shoulder      History of Present Illness:    Pravin Acevedo presents to clinic for left shoulder pain.  Patient denies known mechanism of injury.  Reports pain for the last few years however this has recently become more constant and bothersome.  He does have known cervical pain which she was being worked up for with Neurosurgery and pain management he is a  and has to push and lift heavy objects.  His pain is mostly anterior, 8/10 aching pain.  He does endorse nighttime pain.  He has tried home exercise program, bike in an extra is with mild improvement.  He had an injection about 8 years ago go to his left shoulder with improvement and is interested in repeating that today.    Occupation:      Ambulating:  Unassisted  Diabetic: no  Smoking: no  Hx of DVT/PE: no    ____________________________________________________________________    Interval history 8/15/2024 : Patient returns today for follow up of chronic left shoulder pain.  He had an EMG surgery and is requesting these results.  Regarding left shoulder reports significant improvement with injection given by myself in May.  States pain recently started back about 2 weeks ago.  He was unable to sleep on his left shoulder again.  Following up with Neurosurgery next week.        PAST MEDICAL HISTORY: No past medical history on file.  PAST SURGICAL HISTORY: No past surgical history on file.  FAMILY HISTORY:   Family History   Problem Relation Name Age of Onset    No Known Problems Mother          passed in [place]    No Known Problems Father      Heart attack Neg Hx      Stroke Neg Hx      Lung cancer Neg Hx      Colon cancer Neg Hx      Prostate cancer Neg Hx       SOCIAL HISTORY:   Social History     Occupational History    Not on file   Tobacco Use    Smoking status: Never    Smokeless tobacco: Never   Substance and Sexual Activity     Alcohol use: Yes     Alcohol/week: 6.0 standard drinks of alcohol     Types: 6 Cans of beer per week     Comment: 6 beer per weekend    Drug use: Never    Sexual activity: Yes     Partners: Female        MEDICATIONS:   Current Outpatient Medications:     atorvastatin (LIPITOR) 20 MG tablet, Take 1 tablet (20 mg total) by mouth once daily., Disp: 90 tablet, Rfl: 3    gabapentin (NEURONTIN) 300 MG capsule, Take 1 capsule (300 mg total) by mouth once daily AND 2 capsules (600 mg total) every evening., Disp: 60 capsule, Rfl: 11    meloxicam (MOBIC) 15 MG tablet, Take 1 tablet (15 mg total) by mouth once daily., Disp: 30 tablet, Rfl: 5    traMADoL (ULTRAM) 50 mg tablet, Take 1 tablet (50 mg total) by mouth nightly as needed for Pain., Disp: 30 tablet, Rfl: 0  No current facility-administered medications for this visit.  ALLERGIES: Review of patient's allergies indicates:  No Known Allergies      Physical Exam     Vitals:    08/15/24 0928   BP: (!) 150/91   Pulse: 90       Alert and oriented to person, place and time. No acute distress. Well-groomed, not ill appearing. Pupils round and reactive, normal respiratory effort, no audible wheezing.     Shoulder / Upper Extremity Exam    OBSERVATION:     Swelling  none  Deformity  none   Discoloration  none   Scapular winging none   Scars   none  Atrophy  none    TENDERNESS                Clavicle   Negative         AC Jt.    Negative        SC Jt.    Negative          Acromion:  Negative        Scapular Spine +   Supraspinatus  +       Infraspinatus  Negative   LH Biceps   Negative   Greater Tub.  +   Trapezius  Negative   Cervical spine  Negative        ROM: (* = with pain)              FE    170° *      ER at 0°    60°   *     ER at 90° ABD  40° *      IR at 90°  ABD   NA         IR (spine level)   T10         STRENGTH: (* = with pain)    SCAPTION   4/5        IR    4/5       ER    5/5       BICEPS   5/5       Deltoid    5/5         SIGNS:  Painful  side       NEER   +   OTONYS  neg    PAGE   +   SPEEDS  neg     DROP ARM   neg   BELLY PRESS neg   Superior escape none    LIFT-OFF  neg   X-Body ADD    neg    MOVING VALGUS neg           Imaging:     Bilateral shoulder X-rays ordered/reviewed by me showing no evidence of fracture or dislocation. There is no obvious malalignment. No evidence of masses, lesions or foreign bodies.  Mild degenerative changes bilaterally.      Assessment & Plan    Chronic left shoulder pain  -     triamcinolone acetonide injection 40 mg  -     Large Joint Aspiration/Injection: L subacromial bursa      Patient returns for chronic left shoulder pain.  Regarding his EMG, he was a follow up with Neurosurgery next week to discuss these results.  Regarding his left shoulder pain, discussed repeat injection versus MRI today.  He was not interested in surgery and would like to hold on MRI at this time.  He understands injections are strictly for pain and do not change what his shoulder looks like.  He would like to follow up in 3 months.    Follow up:  Three months or sooner if needed  X-rays next visit:  None    All questions were answered and patient is agreeable to the above plan.

## 2024-08-19 ENCOUNTER — OFFICE VISIT (OUTPATIENT)
Dept: NEUROSURGERY | Facility: CLINIC | Age: 59
End: 2024-08-19
Payer: COMMERCIAL

## 2024-08-19 VITALS — SYSTOLIC BLOOD PRESSURE: 154 MMHG | HEART RATE: 81 BPM | DIASTOLIC BLOOD PRESSURE: 96 MMHG

## 2024-08-19 DIAGNOSIS — M48.02 SPINAL STENOSIS, CERVICAL REGION: Primary | ICD-10-CM

## 2024-08-19 PROCEDURE — 99214 OFFICE O/P EST MOD 30 MIN: CPT | Mod: S$GLB,,, | Performed by: STUDENT IN AN ORGANIZED HEALTH CARE EDUCATION/TRAINING PROGRAM

## 2024-08-19 PROCEDURE — 3077F SYST BP >= 140 MM HG: CPT | Mod: CPTII,S$GLB,, | Performed by: STUDENT IN AN ORGANIZED HEALTH CARE EDUCATION/TRAINING PROGRAM

## 2024-08-19 PROCEDURE — 3044F HG A1C LEVEL LT 7.0%: CPT | Mod: CPTII,S$GLB,, | Performed by: STUDENT IN AN ORGANIZED HEALTH CARE EDUCATION/TRAINING PROGRAM

## 2024-08-19 PROCEDURE — 3080F DIAST BP >= 90 MM HG: CPT | Mod: CPTII,S$GLB,, | Performed by: STUDENT IN AN ORGANIZED HEALTH CARE EDUCATION/TRAINING PROGRAM

## 2024-08-19 PROCEDURE — 1159F MED LIST DOCD IN RCRD: CPT | Mod: CPTII,S$GLB,, | Performed by: STUDENT IN AN ORGANIZED HEALTH CARE EDUCATION/TRAINING PROGRAM

## 2024-08-19 PROCEDURE — 99999 PR PBB SHADOW E&M-EST. PATIENT-LVL II: CPT | Mod: PBBFAC,,, | Performed by: STUDENT IN AN ORGANIZED HEALTH CARE EDUCATION/TRAINING PROGRAM

## 2024-08-19 NOTE — PROGRESS NOTES
Neurosurgery  Established Patient    SUBJECTIVE:     History of Present Illness:  Pravin Acevedo is a 58 y.o. male being seen in clinic today to discuss concerns with chronic neck and left shoulder/arm pain. States that he has obtained an injection in the past with mild relief and continues to take gabapentin, Tramadol, and Meloxicam as needed with relief. He is apprehensive about surgery but concerned about the progression of his pain and paraesthesias. Describes the pain as aching down the left-side of the neck extending into the shoulder and down into the 1st and 2nd digits. Denies right-sided pain. Rates the pain as a 7/10. Aggravating factors include movement and lifting objects. Alleviating factors include rest and medications. Denies b/b dysfunction, saddle anesthesia, or gait instability. Endorses weakness in the hand when the pain is severe. Denies frequent dropping of objects.      Interval fu 6/17/24:  Pt presents in fu.  He has undergone left subacromial bursa injection which has helped improve his left shoulder pain.  He feels that his LUE radicular pain has also improved mildly.  He still has parasthesias in the first 2 digits of his left hand.  Shaking out his left hand can alleviated some of the numbness/tingling.  Raising his left arm over his head used to improve the LUE radciulopathy as well.  He denies symptoms of myelopathy.  He is a nonsmoker.  He hasn't started PT or done injections for his cervical spine.     Interval fu 8/19/24:  He presents in fu after the EMG.  He continues to have left sided neck pain which radiates into the first 2 digits of his left hand.    Review of patient's allergies indicates:  No Known Allergies    Current Outpatient Medications   Medication Sig Dispense Refill    atorvastatin (LIPITOR) 20 MG tablet Take 1 tablet (20 mg total) by mouth once daily. 90 tablet 3    gabapentin (NEURONTIN) 300 MG capsule Take 1 capsule (300 mg total) by mouth once daily AND 2  capsules (600 mg total) every evening. 60 capsule 11    meloxicam (MOBIC) 15 MG tablet Take 1 tablet (15 mg total) by mouth once daily. 30 tablet 5    traMADoL (ULTRAM) 50 mg tablet Take 1 tablet (50 mg total) by mouth nightly as needed for Pain. 30 tablet 0     No current facility-administered medications for this visit.       No past medical history on file.  No past surgical history on file.  Family History       Problem Relation (Age of Onset)    No Known Problems Mother, Father          Social History     Socioeconomic History    Marital status:    Tobacco Use    Smoking status: Never    Smokeless tobacco: Never   Substance and Sexual Activity    Alcohol use: Yes     Alcohol/week: 6.0 standard drinks of alcohol     Types: 6 Cans of beer per week     Comment: 6 beer per weekend    Drug use: Never    Sexual activity: Yes     Partners: Female       Review of Systems  14 point ROS was negative    OBJECTIVE:     Vital Signs  Pulse: 81  BP: (!) 154/96  Pain Score:   7  There is no height or weight on file to calculate BMI.    Neurosurgery Physical Exam  Constitutional: He appears well-developed and well-nourished.      Eyes: Pupils are equal, round, and reactive to light.      Cardiovascular: Normal rate and regular rhythm.      Abdominal: Soft.      Psych/Behavior: He is alert. He is oriented to person, place, and time. He has a normal mood and affect.      Musculoskeletal: Gait is normal.        Neck: Range of motion is limited.        Back: Range of motion is limited.        Right Upper Extremities: Muscle strength is 5/5.        Left Upper Extremities: Muscle strength is 5/5.       Right Lower Extremities: Muscle strength is 5/5.        Left Lower Extremities: Muscle strength is 5/5.      Neurological:        Coordination: He has a normal Romberg Test and normal tandem walking coordination.        Sensory: There is no sensory deficit in the trunk. There is no sensory deficit in the extremities.         DTRs: DTRs are DTRS NORMAL AND SYMMETRICnormal and symmetric.        Cranial nerves: Cranial nerve(s) II, III, IV, V, VI, VII, VIII, IX, X, XI and XII are intact.      No vieira's  Negative tinel's at left wrist.  +phalen's/reverse phalen's left   Negative spurling left    Diagnostic Results:  EMG: dennervation of left paraspinals, no specific radiculopathy noted.  Reviewed    Flex/ex c: no instability  CT c spine: no opll  MRI c spine: multilevel degenerative changes worse at C5-7 causing left greater than right severe foraminal stenosis.  MRI L spine: epidural lipomatosis causing moderate to severe stenosis from L4-S1.    ASSESSMENT/PLAN:     59 M presents for eval of left C6 radiculopathy. He is intact on exam. His imaging is described above and his updated EMG only showed dennervation of the left paraspinal muscles which could correlate to a radiculopathy however, no specific levels were identified.  I think that he would benefit from C5/6 BERENICE.  Will plan to see him following.

## 2024-08-26 ENCOUNTER — OFFICE VISIT (OUTPATIENT)
Dept: PRIMARY CARE CLINIC | Facility: CLINIC | Age: 59
End: 2024-08-26
Payer: COMMERCIAL

## 2024-08-26 VITALS
HEIGHT: 66 IN | BODY MASS INDEX: 41.03 KG/M2 | WEIGHT: 255.31 LBS | SYSTOLIC BLOOD PRESSURE: 130 MMHG | TEMPERATURE: 98 F | RESPIRATION RATE: 16 BRPM | HEART RATE: 80 BPM | OXYGEN SATURATION: 96 % | DIASTOLIC BLOOD PRESSURE: 86 MMHG

## 2024-08-26 DIAGNOSIS — G89.29 CHRONIC MIDLINE LOW BACK PAIN WITHOUT SCIATICA: ICD-10-CM

## 2024-08-26 DIAGNOSIS — E66.01 OBESITY, MORBID, BMI 40.0-49.9: ICD-10-CM

## 2024-08-26 DIAGNOSIS — M50.30 DDD (DEGENERATIVE DISC DISEASE), CERVICAL: ICD-10-CM

## 2024-08-26 DIAGNOSIS — G89.29 CHRONIC LEFT SHOULDER PAIN: Primary | ICD-10-CM

## 2024-08-26 DIAGNOSIS — M54.50 CHRONIC MIDLINE LOW BACK PAIN WITHOUT SCIATICA: ICD-10-CM

## 2024-08-26 DIAGNOSIS — R60.0 LOWER EXTREMITY EDEMA: ICD-10-CM

## 2024-08-26 DIAGNOSIS — M25.512 CHRONIC LEFT SHOULDER PAIN: Primary | ICD-10-CM

## 2024-08-26 DIAGNOSIS — M48.02 FORAMINAL STENOSIS OF CERVICAL REGION: ICD-10-CM

## 2024-08-26 DIAGNOSIS — R73.03 PREDIABETES: ICD-10-CM

## 2024-08-26 PROCEDURE — 3079F DIAST BP 80-89 MM HG: CPT | Mod: CPTII,S$GLB,, | Performed by: STUDENT IN AN ORGANIZED HEALTH CARE EDUCATION/TRAINING PROGRAM

## 2024-08-26 PROCEDURE — 1159F MED LIST DOCD IN RCRD: CPT | Mod: CPTII,S$GLB,, | Performed by: STUDENT IN AN ORGANIZED HEALTH CARE EDUCATION/TRAINING PROGRAM

## 2024-08-26 PROCEDURE — 3044F HG A1C LEVEL LT 7.0%: CPT | Mod: CPTII,S$GLB,, | Performed by: STUDENT IN AN ORGANIZED HEALTH CARE EDUCATION/TRAINING PROGRAM

## 2024-08-26 PROCEDURE — 3008F BODY MASS INDEX DOCD: CPT | Mod: CPTII,S$GLB,, | Performed by: STUDENT IN AN ORGANIZED HEALTH CARE EDUCATION/TRAINING PROGRAM

## 2024-08-26 PROCEDURE — 99999 PR PBB SHADOW E&M-EST. PATIENT-LVL IV: CPT | Mod: PBBFAC,,, | Performed by: STUDENT IN AN ORGANIZED HEALTH CARE EDUCATION/TRAINING PROGRAM

## 2024-08-26 PROCEDURE — 99214 OFFICE O/P EST MOD 30 MIN: CPT | Mod: S$GLB,,, | Performed by: STUDENT IN AN ORGANIZED HEALTH CARE EDUCATION/TRAINING PROGRAM

## 2024-08-26 PROCEDURE — 3075F SYST BP GE 130 - 139MM HG: CPT | Mod: CPTII,S$GLB,, | Performed by: STUDENT IN AN ORGANIZED HEALTH CARE EDUCATION/TRAINING PROGRAM

## 2024-08-26 PROCEDURE — 1160F RVW MEDS BY RX/DR IN RCRD: CPT | Mod: CPTII,S$GLB,, | Performed by: STUDENT IN AN ORGANIZED HEALTH CARE EDUCATION/TRAINING PROGRAM

## 2024-08-26 RX ORDER — MELOXICAM 15 MG/1
15 TABLET ORAL DAILY
Qty: 30 TABLET | Refills: 5 | Status: SHIPPED | OUTPATIENT
Start: 2024-08-26

## 2024-08-26 RX ORDER — TRAMADOL HYDROCHLORIDE 50 MG/1
50 TABLET ORAL NIGHTLY PRN
Qty: 30 TABLET | Refills: 2 | Status: SHIPPED | OUTPATIENT
Start: 2024-08-26

## 2024-08-26 NOTE — PROGRESS NOTES
"Subjective:           Patient ID: Pravin Acevedo   Age:  59 y.o.  Sex: male     Chief Complaint:   Follow-up (Back/neck pain & lab review) and Shoulder Pain (Left shoulder)      History of Present Illness:    Pravin Acevedo is a 59 y.o. male who presents today with a chief complaint of Follow-up (Back/neck pain & lab review) and Shoulder Pain (Left shoulder)  .    58yo male presenting for f/u on back/neck/shoulder pain, to review labs.    Has seen Pain med, neurology, Ortho and neurosurgery.     States that he's seen a bunch of doctors.  Was told that they are leaving the prescibing of pain meds to PCP as we've done this already.   Was advised to lose 40 lb if possible to make surgery safer if that becomes an option.    Was doing OT in July, but seemed to be aggravating the neck and shoulder making pain worse; so was advised to take a break from it right now and rest the shoulder/neck while getting Neuro/Ortho evals.    Neuro did an EMG on 8/13/24.  "NCV & EMG Findings:  All nerve conduction studies (as indicated in the following tables) were within normal limits.  Needle evaluation of the left Cervical Paraspinals (Lower) muscle showed increased insertional activity and moderately increased spontaneous activity.  All remaining muscles (as indicated in the following table) showed no evidence of electrical instability.    IMPRESSIONS:  The left lower cervical paraspinals showed signs of active and chronic denervation.  When isolated to the paraspinals (and with no prior history of posterior cervical surgery), this can be seen with either isolated dorsal ramus irritation or from a radiculopathy of undetermined level.         - Vishal Aragon D.O."    Then saw, Shikha Lewis, on 8/15/24.  Got a steroid shot at that appt.   "Assessment & Plan     Chronic left shoulder pain  -     triamcinolone acetonide injection 40 mg  -     Large Joint Aspiration/Injection: L subacromial bursa        Patient returns for " "chronic left shoulder pain.  Regarding his EMG, he was a follow up with Neurosurgery next week to discuss these results.  Regarding his left shoulder pain, discussed repeat injection versus MRI today.  He was not interested in surgery and would like to hold on MRI at this time.  He understands injections are strictly for pain and do not change what his shoulder looks like.  He would like to follow up in 3 months.     Follow up:  Three months or sooner if needed"      Then on 8/19/24, saw Neurosurgery, Dr. Almonte:  "  ASSESSMENT/PLAN:      59 M presents for eval of left C6 radiculopathy. He is intact on exam. His imaging is described above and his updated EMG only showed dennervation of the left paraspinal muscles which could correlate to a radiculopathy however, no specific levels were identified.  I think that he would benefit from C5/6 BERENICE.  Will plan to see him following.               Electronically signed by Vishal Almonte DO   "          Review of Systems   Constitutional: Negative.  Negative for chills, fatigue and fever.   HENT: Negative.  Negative for congestion, rhinorrhea, sinus pressure, sinus pain and sneezing.    Eyes: Negative.    Respiratory: Negative.  Negative for cough, shortness of breath and wheezing.    Cardiovascular: Negative.  Negative for chest pain, palpitations and leg swelling.   Gastrointestinal: Negative.  Negative for abdominal distention, constipation, diarrhea and vomiting.   Endocrine: Negative.    Genitourinary: Negative.  Negative for difficulty urinating and frequency.   Musculoskeletal:  Positive for arthralgias, back pain and joint swelling.   Skin: Negative.  Negative for rash.   Allergic/Immunologic: Negative for food allergies.   Neurological:  Positive for weakness (Left hand, 1st and 2nd digit). Negative for headaches.   Psychiatric/Behavioral: Negative.  Negative for sleep disturbance.            Objective:        Vitals:    08/26/24 0809   BP: 130/86   BP " "Location: Right arm   Patient Position: Sitting   BP Method: Medium (Manual)   Pulse: 80   Resp: 16   Temp: 98.4 °F (36.9 °C)   TempSrc: Oral   SpO2: 96%   Weight: 115.8 kg (255 lb 4.7 oz)   Height: 5' 6" (1.676 m)       Body mass index is 41.21 kg/m².      Physical Exam  Constitutional:       Appearance: Normal appearance. He is obese. He is not ill-appearing or toxic-appearing.      Comments: As per BMI.   HENT:      Head: Normocephalic and atraumatic.      Right Ear: External ear normal.      Left Ear: External ear normal.      Nose: No congestion.      Mouth/Throat:      Mouth: Mucous membranes are moist.      Pharynx: Oropharynx is clear.   Eyes:      Extraocular Movements: Extraocular movements intact.      Conjunctiva/sclera: Conjunctivae normal.   Cardiovascular:      Rate and Rhythm: Normal rate and regular rhythm.      Pulses: Normal pulses.      Heart sounds: No murmur heard.  Pulmonary:      Effort: Pulmonary effort is normal. No respiratory distress.      Breath sounds: No wheezing.   Abdominal:      General: Bowel sounds are normal.      Palpations: Abdomen is soft.      Tenderness: There is no right CVA tenderness or left CVA tenderness.   Musculoskeletal:         General: No swelling.      Cervical back: Normal range of motion.      Right lower leg: No edema.      Left lower leg: No edema.   Skin:     General: Skin is warm.      Capillary Refill: Capillary refill takes less than 2 seconds.      Coloration: Skin is not jaundiced.   Neurological:      General: No focal deficit present.      Mental Status: He is alert and oriented to person, place, and time.      Motor: Weakness present.      Comments: On exam patient's left hand demonstrate some weakness to left thumb and left index finger, having decreased strength relative to right side.   Psychiatric:         Mood and Affect: Mood normal.           No past medical history on file.    Lab Results   Component Value Date     05/10/2024    K 4.1 " "05/10/2024     05/10/2024    CO2 23 05/10/2024    BUN 11 05/10/2024    CREATININE 0.9 05/10/2024    ANIONGAP 9 05/10/2024     Lab Results   Component Value Date    HGBA1C 5.8 (H) 05/10/2024     No results found for: "BNP", "BNPTRIAGEBLO"    Lab Results   Component Value Date    WBC 3.72 (L) 05/10/2024    HGB 13.5 (L) 05/10/2024    HCT 41.0 05/10/2024     05/10/2024    GRAN 2.0 05/10/2024    GRAN 54.3 05/10/2024     Lab Results   Component Value Date    CHOL 149 05/10/2024    HDL 41 05/10/2024    LDLCALC 80.2 05/10/2024    TRIG 139 05/10/2024        Outpatient Encounter Medications as of 8/26/2024   Medication Sig Dispense Refill    atorvastatin (LIPITOR) 20 MG tablet Take 1 tablet (20 mg total) by mouth once daily. 90 tablet 3    gabapentin (NEURONTIN) 300 MG capsule Take 1 capsule (300 mg total) by mouth once daily AND 2 capsules (600 mg total) every evening. 60 capsule 11    [DISCONTINUED] meloxicam (MOBIC) 15 MG tablet Take 1 tablet (15 mg total) by mouth once daily. 30 tablet 5    [DISCONTINUED] traMADoL (ULTRAM) 50 mg tablet Take 1 tablet (50 mg total) by mouth nightly as needed for Pain. 30 tablet 0    meloxicam (MOBIC) 15 MG tablet Take 1 tablet (15 mg total) by mouth once daily. 30 tablet 5    traMADoL (ULTRAM) 50 mg tablet Take 1 tablet (50 mg total) by mouth nightly as needed for Pain. 30 tablet 2     No facility-administered encounter medications on file as of 8/26/2024.          Assessment:       1. Chronic left shoulder pain    2. Chronic midline low back pain without sciatica    3. DDD (degenerative disc disease), cervical    4. Lower extremity edema    5. Obesity, morbid, BMI 40.0-49.9    6. Prediabetes    7. Foraminal stenosis of cervical region           Plan:           Chronic left shoulder pain   - states that his major issues is neck pain and shoulder pain.   - was told has some cervical nerve issues in C5-C6.  Considering BERENICE w/ Neurosurgery.     - also was advised to try losing 40 " lb to have option for surgery if needed.   - continue with Meloxicam 15mg in AM.   - Gabapentin 300mg in AM and 600mg at night.   - Tylenol as needed.  No NSAIDs after Meloxicam.   - using Tramadol 50mg PRN, once daily.     Chronic midline low back pain without sciatica   - states that his major issues is neck pain and shoulder pain.   - was told has some cervical nerve issues in C5-C6.  Considering BERENICE w/ Neurosurgery.     - also was advised to try losing 40 lb to have option for surgery if needed.    DDD (degenerative disc disease), cervical   - as above.   - asking about Disability.  Give contact number at bottom of note to reach out to Disability clinic for eval.    Lower extremity edema   - stable, monitor.     Prediabetes   - last A1c was 5.8%, stable.   - will monitor.  Shoulder limit carbs or sweets as possible. Watch weight.    Foraminal stenosis of cervical region  -     traMADoL (ULTRAM) 50 mg tablet; Take 1 tablet (50 mg total) by mouth nightly as needed for Pain.  Dispense: 30 tablet; Refill: 2  -     meloxicam (MOBIC) 15 MG tablet; Take 1 tablet (15 mg total) by mouth once daily.  Dispense: 30 tablet; Refill: 5        Chronic Neck, back and shoulder pain:    For Disability Evaluation:  [facility]  Phone number: [phone]     [place] [employer]  [place] Office  [address] [address] [phone]    [place] [employer] Determination [facility]  [place] [facility]  [address]  [address]  [place] [address]  Phone: [phone]    Obesity, morbid, BMI 40.0-49.9  Weight Loss:   - Body mass index is 41.21 kg/m².   - Normal weight is BMI 18-24.9.     - Overweight: 25-29.9  - Class 1 Obesity: 30-34.9  - Class 2 Obesity: 35-39.9  - Class 3 Obesity: 40+  - A BMI under 18 also shows diminished health outcomes.   - best health outcomes have been seen at a BMI of 22.    - excess weigh  "affects many body systems, including: cardiac, respiratory, GI, endocrine, musculoskeletal, dermatologic, reproductive, mental health and more.   - recommended moderate weight change, 1-2lbs per weeks.   - focus on eating a healthy sustainable diet.  Use food diary for at least a couple weeks to better understand what your diet.   - consider jackie such as "Lose It" or "Noom".   - avoid empty calories that you may use daily from items such as like soda, sweet tea, sugary coffee, ice cream, cake/pie, cookies/brownies/crackers or candy.  An occasional piece of birthday cake is not the cause of obesity, but a daily Frappaccino could be to blame.    - "Low Fat" on a box or bag should be eyed with caution, this fat it typically replaced with forms of sugar/carbs and the resultant product may be less healthy than other products.   - when possible eating whole foods is almost always preferable.  A diet of salads, green beans, broccoli, cauliflower, cucumbers, sweet potatoes, peppers, olives/avocados, tomatoes, beats, berries, eggs, meat/fish, shrimp/crawfish with some limited fruit (apples/oranges/bananas/grapes) and even more limited grains/starches (pasta/rice/bread/potatoes/cereal) will serve you much better in the long term than eating a bunch of diet bars, shakes or powders.     - Exercise has many benefits (heart health, improved mood/energy, higher self esteem, less depression, greater strength/flexibility, better sleep, less stress/anxiety, improved immune system, stronger bones, improved cognition, fewer colds/asthma exacerbations), it also does help lose weight.  But weight loss from exercise is much less impactful than when a change in diet can achieve.  Exercise is highly encouraged, but diet change should be the primary tool used to lose weight.         "

## 2024-08-26 NOTE — PATIENT INSTRUCTIONS
Chronic left shoulder pain   - states that his major issues is neck pain and shoulder pain.   - was told has some cervical nerve issues in C5-C6.  Considering BERENICE w/ Neurosurgery.     - also was advised to try losing 40 lb to have option for surgery if needed.   - continue with Meloxicam 15mg in AM.   - Gabapentin 300mg in AM and 600mg at night.   - Tylenol as needed.  No NSAIDs after Meloxicam.   - using Tramadol 50mg PRN, once daily.     Chronic midline low back pain without sciatica   - states that his major issues is neck pain and shoulder pain.   - was told has some cervical nerve issues in C5-C6.  Considering BERENICE w/ Neurosurgery.     - also was advised to try losing 40 lb to have option for surgery if needed.    DDD (degenerative disc disease), cervical   - as above.   - asking about Disability.  Give contact number at bottom of note to reach out to Disability clinic for eval.    Lower extremity edema   - stable, monitor.     Prediabetes   - last A1c was 5.8%, stable.   - will monitor.  Shoulder limit carbs or sweets as possible. Watch weight.    Foraminal stenosis of cervical region  -     traMADoL (ULTRAM) 50 mg tablet; Take 1 tablet (50 mg total) by mouth nightly as needed for Pain.  Dispense: 30 tablet; Refill: 2  -     meloxicam (MOBIC) 15 MG tablet; Take 1 tablet (15 mg total) by mouth once daily.  Dispense: 30 tablet; Refill: 5        Chronic Neck, back and shoulder pain:    For Disability Evaluation:  [facility]  Phone number: [phone]     [place] [employer]  [place] Office  [address] [address] [phone]    [place] [employer] [facility]  [place] [facility]  [address]  [address]  [place] [address]  Phone: [phone]    Obesity, morbid, BMI 40.0-49.9  Weight Loss:   - Body mass index is 41.21 kg/m².   - Normal weight is BMI 18-24.9.     - Overweight:  "25-29.9  - Class 1 Obesity: 30-34.9  - Class 2 Obesity: 35-39.9  - Class 3 Obesity: 40+  - A BMI under 18 also shows diminished health outcomes.   - best health outcomes have been seen at a BMI of 22.    - excess weigh affects many body systems, including: cardiac, respiratory, GI, endocrine, musculoskeletal, dermatologic, reproductive, mental health and more.   - recommended moderate weight change, 1-2lbs per weeks.   - focus on eating a healthy sustainable diet.  Use food diary for at least a couple weeks to better understand what your diet.   - consider jackie such as "Lose It" or "Noom".   - avoid empty calories that you may use daily from items such as like soda, sweet tea, sugary coffee, ice cream, cake/pie, cookies/brownies/crackers or candy.  An occasional piece of birthday cake is not the cause of obesity, but a daily Frappaccino could be to blame.    - "Low Fat" on a box or bag should be eyed with caution, this fat it typically replaced with forms of sugar/carbs and the resultant product may be less healthy than other products.   - when possible eating whole foods is almost always preferable.  A diet of salads, green beans, broccoli, cauliflower, cucumbers, sweet potatoes, peppers, olives/avocados, tomatoes, beats, berries, eggs, meat/fish, shrimp/crawfish with some limited fruit (apples/oranges/bananas/grapes) and even more limited grains/starches (pasta/rice/bread/potatoes/cereal) will serve you much better in the long term than eating a bunch of diet bars, shakes or powders.     - Exercise has many benefits (heart health, improved mood/energy, higher self esteem, less depression, greater strength/flexibility, better sleep, less stress/anxiety, improved immune system, stronger bones, improved cognition, fewer colds/asthma exacerbations), it also does help lose weight.  But weight loss from exercise is much less impactful than when a change in diet can achieve.  Exercise is highly encouraged, but diet change " should be the primary tool used to lose weight.

## 2024-09-19 ENCOUNTER — PATIENT MESSAGE (OUTPATIENT)
Dept: PRIMARY CARE CLINIC | Facility: CLINIC | Age: 59
End: 2024-09-19
Payer: COMMERCIAL

## 2024-09-30 DIAGNOSIS — M48.02 FORAMINAL STENOSIS OF CERVICAL REGION: ICD-10-CM

## 2024-09-30 RX ORDER — MELOXICAM 15 MG/1
TABLET ORAL
Qty: 30 TABLET | Refills: 5 | Status: SHIPPED | OUTPATIENT
Start: 2024-09-30

## 2024-09-30 NOTE — TELEPHONE ENCOUNTER
No care due was identified.  [facility] Embedded Care Due Messages. Reference number: [id number].   9/30/2024 5:07:41 PM CDT

## 2024-10-16 ENCOUNTER — PATIENT MESSAGE (OUTPATIENT)
Dept: RESEARCH | Facility: HOSPITAL | Age: 59
End: 2024-10-16
Payer: COMMERCIAL

## 2024-10-17 ENCOUNTER — PATIENT MESSAGE (OUTPATIENT)
Dept: RESEARCH | Facility: HOSPITAL | Age: 59
End: 2024-10-17
Payer: COMMERCIAL

## 2024-10-17 DIAGNOSIS — M48.02 FORAMINAL STENOSIS OF CERVICAL REGION: ICD-10-CM

## 2024-10-17 DIAGNOSIS — E66.01 OBESITY, MORBID, BMI 40.0-49.9: ICD-10-CM

## 2024-10-17 RX ORDER — CHLORHEXIDINE GLUCONATE ORAL RINSE 1.2 MG/ML
SOLUTION DENTAL
COMMUNITY
Start: 2024-10-08

## 2024-10-17 RX ORDER — GABAPENTIN 300 MG/1
CAPSULE ORAL
Qty: 90 CAPSULE | Refills: 11 | Status: SHIPPED | OUTPATIENT
Start: 2024-10-17 | End: 2025-10-17

## 2024-10-17 RX ORDER — TRAMADOL HYDROCHLORIDE 50 MG/1
50 TABLET ORAL NIGHTLY PRN
Qty: 30 TABLET | Refills: 2 | Status: SHIPPED | OUTPATIENT
Start: 2024-10-17

## 2024-10-17 NOTE — TELEPHONE ENCOUNTER
No care due was identified.  [facility] Embedded Care Due Messages. Reference number: [id number].   10/17/2024 11:51:32 AM CDT

## 2024-10-25 ENCOUNTER — PATIENT MESSAGE (OUTPATIENT)
Dept: RESEARCH | Facility: HOSPITAL | Age: 59
End: 2024-10-25
Payer: COMMERCIAL

## 2024-11-21 ENCOUNTER — OFFICE VISIT (OUTPATIENT)
Dept: ORTHOPEDICS | Facility: CLINIC | Age: 59
End: 2024-11-21
Payer: COMMERCIAL

## 2024-11-21 VITALS
BODY MASS INDEX: 42.79 KG/M2 | SYSTOLIC BLOOD PRESSURE: 141 MMHG | DIASTOLIC BLOOD PRESSURE: 78 MMHG | WEIGHT: 265.13 LBS | HEART RATE: 87 BPM

## 2024-11-21 DIAGNOSIS — G89.29 CHRONIC LEFT SHOULDER PAIN: Primary | ICD-10-CM

## 2024-11-21 DIAGNOSIS — M25.512 CHRONIC LEFT SHOULDER PAIN: Primary | ICD-10-CM

## 2024-11-21 PROCEDURE — 99999 PR PBB SHADOW E&M-EST. PATIENT-LVL III: CPT | Mod: PBBFAC,,,

## 2024-11-21 PROCEDURE — 99213 OFFICE O/P EST LOW 20 MIN: CPT | Mod: S$GLB,,,

## 2024-11-21 PROCEDURE — 3008F BODY MASS INDEX DOCD: CPT | Mod: CPTII,S$GLB,,

## 2024-11-21 PROCEDURE — 1160F RVW MEDS BY RX/DR IN RCRD: CPT | Mod: CPTII,S$GLB,,

## 2024-11-21 PROCEDURE — 3077F SYST BP >= 140 MM HG: CPT | Mod: CPTII,S$GLB,,

## 2024-11-21 PROCEDURE — 3044F HG A1C LEVEL LT 7.0%: CPT | Mod: CPTII,S$GLB,,

## 2024-11-21 PROCEDURE — 3078F DIAST BP <80 MM HG: CPT | Mod: CPTII,S$GLB,,

## 2024-11-21 PROCEDURE — 1159F MED LIST DOCD IN RCRD: CPT | Mod: CPTII,S$GLB,,

## 2024-11-21 NOTE — PROGRESS NOTES
Patient ID: Pravin Acevedo is a 59 y.o. male    No chief complaint on file.      History of Present Illness:    Pravin Acevedo presents to clinic for left shoulder pain.  Patient denies known mechanism of injury.  Reports pain for the last few years however this has recently become more constant and bothersome.  He does have known cervical pain which she was being worked up for with Neurosurgery and pain management he is a  and has to push and lift heavy objects.  His pain is mostly anterior, 8/10 aching pain.  He does endorse nighttime pain.  He has tried home exercise program, bike in an extra is with mild improvement.  He had an injection about 8 years ago go to his left shoulder with improvement and is interested in repeating that today.    Occupation:      Ambulating:  Unassisted  Diabetic: no  Smoking: no  Hx of DVT/PE: no    ____________________________________________________________________    Interval history 8/15/2024 : Patient returns today for follow up of chronic left shoulder pain.  He had an EMG surgery and is requesting these results.  Regarding left shoulder reports significant improvement with injection given by myself in May.  States pain recently started back about 2 weeks ago.  He was unable to sleep on his left shoulder again.  Following up with Neurosurgery next week.      ____________________________________________________________________    Interval history 11/21/2024 : Patient returns today for follow up of left shoulder pain.  States injection helped his pain and he was interested in repeating this today.  He is working on losing weight.  Saw neurosurgery who recommended epidural injection which he is interested in proceeding with.        PAST MEDICAL HISTORY: No past medical history on file.  PAST SURGICAL HISTORY: No past surgical history on file.  FAMILY HISTORY:   Family History   Problem Relation Name Age of Onset    No Known Problems  Mother          passed in [place]    No Known Problems Father      Heart attack Neg Hx      Stroke Neg Hx      Lung cancer Neg Hx      Colon cancer Neg Hx      Prostate cancer Neg Hx       SOCIAL HISTORY:   Social History     Occupational History    Not on file   Tobacco Use    Smoking status: Never    Smokeless tobacco: Never   Substance and Sexual Activity    Alcohol use: Yes     Alcohol/week: 6.0 standard drinks of alcohol     Types: 6 Cans of beer per week     Comment: 6 beer per weekend    Drug use: Never    Sexual activity: Yes     Partners: Female        MEDICATIONS:   Current Outpatient Medications:     atorvastatin (LIPITOR) 20 MG tablet, Take 1 tablet (20 mg total) by mouth once daily., Disp: 90 tablet, Rfl: 3    chlorhexidine (PERIDEX) 0.12 % solution, SMARTSIG:By Mouth, Disp: , Rfl:     gabapentin (NEURONTIN) 300 MG capsule, Take 1 capsule (300 mg total) by mouth once daily AND 2 capsules (600 mg total) every evening., Disp: 90 capsule, Rfl: 11    meloxicam (MOBIC) 15 MG tablet, TAKE 1 TABLET(15 MG) BY MOUTH DAILY, Disp: 30 tablet, Rfl: 5    traMADoL (ULTRAM) 50 mg tablet, Take 1 tablet (50 mg total) by mouth nightly as needed for Pain., Disp: 30 tablet, Rfl: 2  ALLERGIES: Review of patient's allergies indicates:  No Known Allergies      Physical Exam     There were no vitals filed for this visit.      Alert and oriented to person, place and time. No acute distress. Well-groomed, not ill appearing. Pupils round and reactive, normal respiratory effort, no audible wheezing.     Shoulder / Upper Extremity Exam    OBSERVATION:     Swelling  none  Deformity  none   Discoloration  none   Scapular winging none   Scars   none  Atrophy  none    TENDERNESS                Clavicle   Negative         AC Jt.    Negative        SC Jt.    Negative          Acromion:  Negative        Scapular Spine +   Supraspinatus  +       Infraspinatus  Negative   LH Biceps   Negative   Greater  Tub.  +   Trapezius  Negative   Cervical spine  Negative        ROM: (* = with pain)              FE    170° *      ER at 0°    60°   *     ER at 90° ABD  40° *      IR at 90°  ABD   NA         IR (spine level)   T10         STRENGTH: (* = with pain)    SCAPTION   4/5        IR    4/5       ER    5/5       BICEPS   5/5       Deltoid    5/5         SIGNS:  Painful side       NEER   +   OTONYS  neg    PAGE   +   SPEEDS  neg     DROP ARM   neg   BELLY PRESS neg   Superior escape none    LIFT-OFF  neg   X-Body ADD    neg    MOVING VALGUS neg           Imaging:     Bilateral shoulder X-rays ordered/reviewed by me showing no evidence of fracture or dislocation. There is no obvious malalignment. No evidence of masses, lesions or foreign bodies.  Mild degenerative changes bilaterally.      Assessment & Plan    Chronic left shoulder pain        Patient returns for chronic left shoulder pain.  Had good relief with previous left shoulder injection which he elects to proceed with repeat 1 today.  Left shoulder CSI given, post-injection instructions reviewed.  Recommended following up with pain management for cervical epidural injection to see how this improves his left upper extremity paresthesias.  He will follow up in 3 months.    Follow up:  Three months or sooner if needed  X-rays next visit:  None    All questions were answered and patient is agreeable to the above plan.

## 2024-11-26 ENCOUNTER — OFFICE VISIT (OUTPATIENT)
Dept: PRIMARY CARE CLINIC | Facility: CLINIC | Age: 59
End: 2024-11-26
Payer: COMMERCIAL

## 2024-11-26 VITALS
SYSTOLIC BLOOD PRESSURE: 124 MMHG | BODY MASS INDEX: 41.33 KG/M2 | WEIGHT: 257.19 LBS | RESPIRATION RATE: 18 BRPM | HEART RATE: 81 BPM | DIASTOLIC BLOOD PRESSURE: 80 MMHG | OXYGEN SATURATION: 98 % | HEIGHT: 66 IN

## 2024-11-26 DIAGNOSIS — Z23 NEED FOR VACCINATION: ICD-10-CM

## 2024-11-26 DIAGNOSIS — M25.512 CHRONIC LEFT SHOULDER PAIN: Primary | ICD-10-CM

## 2024-11-26 DIAGNOSIS — R74.02 ELEVATED LDH: ICD-10-CM

## 2024-11-26 DIAGNOSIS — Z12.11 COLON CANCER SCREENING: ICD-10-CM

## 2024-11-26 DIAGNOSIS — R73.03 PREDIABETES: ICD-10-CM

## 2024-11-26 DIAGNOSIS — G89.29 CHRONIC LEFT SHOULDER PAIN: Primary | ICD-10-CM

## 2024-11-26 DIAGNOSIS — M48.02 FORAMINAL STENOSIS OF CERVICAL REGION: ICD-10-CM

## 2024-11-26 DIAGNOSIS — E66.01 OBESITY, MORBID, BMI 40.0-49.9: ICD-10-CM

## 2024-11-26 PROCEDURE — 1159F MED LIST DOCD IN RCRD: CPT | Mod: CPTII,S$GLB,, | Performed by: STUDENT IN AN ORGANIZED HEALTH CARE EDUCATION/TRAINING PROGRAM

## 2024-11-26 PROCEDURE — 99999 PR PBB SHADOW E&M-EST. PATIENT-LVL IV: CPT | Mod: PBBFAC,,, | Performed by: STUDENT IN AN ORGANIZED HEALTH CARE EDUCATION/TRAINING PROGRAM

## 2024-11-26 PROCEDURE — 1160F RVW MEDS BY RX/DR IN RCRD: CPT | Mod: CPTII,S$GLB,, | Performed by: STUDENT IN AN ORGANIZED HEALTH CARE EDUCATION/TRAINING PROGRAM

## 2024-11-26 PROCEDURE — 99214 OFFICE O/P EST MOD 30 MIN: CPT | Mod: 25,S$GLB,, | Performed by: STUDENT IN AN ORGANIZED HEALTH CARE EDUCATION/TRAINING PROGRAM

## 2024-11-26 PROCEDURE — 90471 IMMUNIZATION ADMIN: CPT | Mod: S$GLB,,, | Performed by: STUDENT IN AN ORGANIZED HEALTH CARE EDUCATION/TRAINING PROGRAM

## 2024-11-26 PROCEDURE — 3008F BODY MASS INDEX DOCD: CPT | Mod: CPTII,S$GLB,, | Performed by: STUDENT IN AN ORGANIZED HEALTH CARE EDUCATION/TRAINING PROGRAM

## 2024-11-26 PROCEDURE — 3074F SYST BP LT 130 MM HG: CPT | Mod: CPTII,S$GLB,, | Performed by: STUDENT IN AN ORGANIZED HEALTH CARE EDUCATION/TRAINING PROGRAM

## 2024-11-26 PROCEDURE — 90656 IIV3 VACC NO PRSV 0.5 ML IM: CPT | Mod: S$GLB,,, | Performed by: STUDENT IN AN ORGANIZED HEALTH CARE EDUCATION/TRAINING PROGRAM

## 2024-11-26 PROCEDURE — 3044F HG A1C LEVEL LT 7.0%: CPT | Mod: CPTII,S$GLB,, | Performed by: STUDENT IN AN ORGANIZED HEALTH CARE EDUCATION/TRAINING PROGRAM

## 2024-11-26 PROCEDURE — 3079F DIAST BP 80-89 MM HG: CPT | Mod: CPTII,S$GLB,, | Performed by: STUDENT IN AN ORGANIZED HEALTH CARE EDUCATION/TRAINING PROGRAM

## 2024-11-26 RX ORDER — ATORVASTATIN CALCIUM 20 MG/1
20 TABLET, FILM COATED ORAL DAILY
Qty: 90 TABLET | Refills: 3 | Status: SHIPPED | OUTPATIENT
Start: 2024-11-26 | End: 2025-11-26

## 2024-11-26 RX ORDER — TRAMADOL HYDROCHLORIDE 50 MG/1
50 TABLET ORAL NIGHTLY PRN
Qty: 30 TABLET | Refills: 5 | Status: SHIPPED | OUTPATIENT
Start: 2024-11-26

## 2024-11-26 RX ORDER — MELOXICAM 15 MG/1
15 TABLET ORAL DAILY PRN
Qty: 30 TABLET | Refills: 5 | Status: SHIPPED | OUTPATIENT
Start: 2024-11-26

## 2024-11-26 NOTE — PATIENT INSTRUCTIONS
Assessment & Plan    Reviewed left shoulder injection with ortho from 5 days ago; considering epidural steroid injection for C-spine  Assessed A1C from May (5.8), indicating prediabetes range; does not recommend diabetes medication at this time  Evaluated need for colorectal cancer screening; last Cologuard in November 2021 was negative  Reviewed current medications including Lipitor, gabapentin, meloxicam, and tramadol  Considered sleep position modifications for shoulder discomfort    SHOULDER PAIN AND OSTEOARTHRITIS:  - Explained rationale behind cold vs. warm therapy for muscle recovery post-exercise.  - Provided information on steam room and ice bath alternation for recovery, noting individual effectiveness varies.  - Consider using meloxicam on an as-needed basis rather than daily, especially if shoulder pain improves.  - Continued meloxicam; recommended changing to daily as needed use.  - Refilled tramadol 50mg for nighttime use; provided 6-month prescription if possible.    PREDIABETES:  - Clarified A1C testing does not require fasting.  - A1C test ordered to be completed within the next few days.    HYPERLIPIDEMIA:  - Continued Lipitor 20mg daily; refill provided.    IMMUNIZATION:  - Discussed benefits of flu vaccine timing in relation to immunity strength through the end of flu season.  - Flu shot administered today.    COLORECTAL CANCER SCREENING:  - Cologuard test ordered for colorectal cancer screening.    FOLLOW-UP:  - Follow up in May for annual check-up.  - Complete lab work a few days before the May appointment.

## 2024-11-26 NOTE — PROGRESS NOTES
Assessment:       1. Chronic left shoulder pain    2. Prediabetes    3. Need for vaccination    4. Colon cancer screening    5. Elevated LDH    6. Obesity, morbid, BMI 40.0-49.9    7. Foraminal stenosis of cervical region           Plan:     Assessment & Plan    Reviewed left shoulder injection with ortho from 5 days ago; considering epidural steroid injection for C-spine  Assessed A1C from May (5.8), indicating prediabetes range; does not recommend diabetes medication at this time  Evaluated need for colorectal cancer screening; last Cologuard in November 2021 was negative  Reviewed current medications including Lipitor, gabapentin, meloxicam, and tramadol  Considered sleep position modifications for shoulder discomfort    SHOULDER PAIN AND OSTEOARTHRITIS:  - Explained rationale behind cold vs. warm therapy for muscle recovery post-exercise.  - Provided information on steam room and ice bath alternation for recovery, noting individual effectiveness varies.  - Consider using meloxicam on an as-needed basis rather than daily, especially if shoulder pain improves.  - Continued meloxicam; recommended changing to daily as needed use.  - Refilled tramadol 50mg for nighttime use; provided 6-month prescription if possible.    PREDIABETES:  - Clarified A1C testing does not require fasting.  - A1C test ordered to be completed within the next few days.    HYPERLIPIDEMIA:  - Continued Lipitor 20mg daily; refill provided.    IMMUNIZATION:  - Discussed benefits of flu vaccine timing in relation to immunity strength through the end of flu season.  - Flu shot administered today.    COLORECTAL CANCER SCREENING:  - Cologuard test ordered for colorectal cancer screening.    FOLLOW-UP:  - Follow up in May for annual check-up.  - Complete lab work a few days before the May appointment.             Chronic left shoulder pain    Prediabetes  -     Hemoglobin A1C; Future; Expected date: 11/26/2024    Need for vaccination  -      influenza (Flulaval, Fluzone, Fluarix) 45 mcg/0.5 mL IM vaccine (> or = 6 mo) 0.5 mL    Colon cancer screening  -     Cologuard Screening (Multitarget Stool DNA); Future; Expected date: 11/26/2024    Elevated LDH  -     atorvastatin (LIPITOR) 20 MG tablet; Take 1 tablet (20 mg total) by mouth once daily.  Dispense: 90 tablet; Refill: 3    Obesity, morbid, BMI 40.0-49.9  -     atorvastatin (LIPITOR) 20 MG tablet; Take 1 tablet (20 mg total) by mouth once daily.  Dispense: 90 tablet; Refill: 3    Foraminal stenosis of cervical region  -     meloxicam (MOBIC) 15 MG tablet; Take 1 tablet (15 mg total) by mouth daily as needed for Pain.  Dispense: 30 tablet; Refill: 5  -     traMADoL (ULTRAM) 50 mg tablet; Take 1 tablet (50 mg total) by mouth nightly as needed for Pain.  Dispense: 30 tablet; Refill: 5                This note was generated with the assistance of ambient listening technology. Verbal consent was obtained by the patient and accompanying visitor(s) for the recording of patient appointment to facilitate this note. I attest to having reviewed and edited the generated note for accuracy, though some syntax or spelling errors may persist. Please contact the author of this note for any clarification.      Subjective:           Patient ID: Pravin Acevedo   Age:  59 y.o.  Sex: male     Chief Complaint:   Follow-up (3 MONTH APPT. ) and Flu Vaccine      History of Present Illness:    Pravin Acevedo is a 59 y.o. male who presents today with a chief complaint of Follow-up (3 MONTH APPT. ) and Flu Vaccine  .    History of Present Illness    CHIEF COMPLAINT:  Pravin presents today for a three-month follow-up.    LEFT SHOULDER PAIN:  He received a left shoulder injection 5 days ago. Previous injections typically provided relief for about 3 months before pain would return. 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 He has been using a green substance in water as  "recommended to help manage the pain. He reports difficulty sleeping on the affected side due to discomfort and numbness.    DIABETES SCREENING:  His previous A1C was 5.8, which falls within the pre-diabetic range. This value was higher than his A1C from the year prior. He denies experiencing fatigue.    COLORECTAL CANCER SCREENING:  His last Cologuard test was performed in November 2021 with negative results. He denies any new symptoms or blood in stool since the previous screening.    MEDICATIONS:  He is currently taking Lipitor 20mg daily, Gabapentin (recently prescribed), Meloxicam every morning, and Tramadol 50mg mostly at night to aid with sleep.      ROS:  General: -fatigue  Gastrointestinal: -blood in stool  Musculoskeletal: +joint pain  Neurological: +numbness           Review of Systems   Constitutional: Negative.  Negative for chills, fatigue and fever.   HENT: Negative.  Negative for congestion, rhinorrhea, sinus pressure, sinus pain and sneezing.    Eyes: Negative.    Respiratory: Negative.  Negative for cough, shortness of breath and wheezing.    Cardiovascular: Negative.  Negative for chest pain, palpitations and leg swelling.   Gastrointestinal: Negative.  Negative for abdominal distention, constipation, diarrhea and vomiting.   Endocrine: Negative.    Genitourinary: Negative.  Negative for difficulty urinating and frequency.   Musculoskeletal:  Positive for arthralgias, back pain and joint swelling.   Skin: Negative.  Negative for rash.   Allergic/Immunologic: Negative for food allergies.   Neurological:  Negative for weakness and headaches.   Psychiatric/Behavioral:  Positive for sleep disturbance. Negative for dysphoric mood and hallucinations. The patient is not nervous/anxious.            Objective:        Vitals:    11/26/24 0759   BP: 124/80   Pulse: 81   Resp: 18   SpO2: 98%   Weight: 116.7 kg (257 lb 2.7 oz)   Height: 5' 6" (1.676 m)       Body mass index is 41.51 kg/m².      Physical " "Exam  Vitals reviewed.   Constitutional:       General: He is not in acute distress.     Appearance: Normal appearance. He is obese. He is not ill-appearing.      Comments: As per BMI.   HENT:      Head: Normocephalic and atraumatic.      Right Ear: External ear normal.      Left Ear: External ear normal.      Nose: Nose normal.   Eyes:      Extraocular Movements: Extraocular movements intact.      Conjunctiva/sclera: Conjunctivae normal.   Cardiovascular:      Rate and Rhythm: Normal rate.      Pulses: Normal pulses.   Pulmonary:      Effort: Pulmonary effort is normal. No respiratory distress.   Musculoskeletal:         General: No swelling or deformity.      Cervical back: Normal range of motion.   Skin:     Capillary Refill: Capillary refill takes less than 2 seconds.   Neurological:      General: No focal deficit present.      Mental Status: He is alert and oriented to person, place, and time.      Gait: Gait normal.   Psychiatric:         Mood and Affect: Mood normal.         Physical Exam    Vitals: Blood pressure normal.               No past medical history on file.    Lab Results   Component Value Date     05/10/2024    K 4.1 05/10/2024     05/10/2024    CO2 23 05/10/2024    BUN 11 05/10/2024    CREATININE 0.9 05/10/2024    ANIONGAP 9 05/10/2024     Lab Results   Component Value Date    HGBA1C 5.8 (H) 05/10/2024     No results found for: "BNP", "BNPTRIAGEBLO"    Lab Results   Component Value Date    WBC 3.72 (L) 05/10/2024    HGB 13.5 (L) 05/10/2024    HCT 41.0 05/10/2024     05/10/2024    GRAN 2.0 05/10/2024    GRAN 54.3 05/10/2024     Lab Results   Component Value Date    CHOL 149 05/10/2024    HDL 41 05/10/2024    LDLCALC 80.2 05/10/2024    TRIG 139 05/10/2024        Outpatient Encounter Medications as of 11/26/2024   Medication Sig Dispense Refill    chlorhexidine (PERIDEX) 0.12 % solution SMARTSIG:By Mouth      gabapentin (NEURONTIN) 300 MG capsule Take 1 capsule (300 mg total) by " mouth once daily AND 2 capsules (600 mg total) every evening. 90 capsule 11    [DISCONTINUED] atorvastatin (LIPITOR) 20 MG tablet Take 1 tablet (20 mg total) by mouth once daily. 90 tablet 3    [DISCONTINUED] meloxicam (MOBIC) 15 MG tablet TAKE 1 TABLET(15 MG) BY MOUTH DAILY 30 tablet 5    [DISCONTINUED] traMADoL (ULTRAM) 50 mg tablet Take 1 tablet (50 mg total) by mouth nightly as needed for Pain. 30 tablet 2    atorvastatin (LIPITOR) 20 MG tablet Take 1 tablet (20 mg total) by mouth once daily. 90 tablet 3    meloxicam (MOBIC) 15 MG tablet Take 1 tablet (15 mg total) by mouth daily as needed for Pain. 30 tablet 5    traMADoL (ULTRAM) 50 mg tablet Take 1 tablet (50 mg total) by mouth nightly as needed for Pain. 30 tablet 5     Facility-Administered Encounter Medications as of 11/26/2024   Medication Dose Route Frequency Provider Last Rate Last Admin    [COMPLETED] influenza (Flulaval, Fluzone, Fluarix) 45 mcg/0.5 mL IM vaccine (> or = 6 mo) 0.5 mL  0.5 mL Intramuscular 1 time in Clinic/HOD    0.5 mL at 11/26/24 0844

## 2024-11-26 NOTE — PROGRESS NOTES
After obtaining consent, and per orders of Dr. Clayton Esparza, injection of 11/26/2024 given by Sowmya Palomares. Patient instructed to remain in clinic for 15 minutes afterwards, and to report any adverse reaction to me immediately.

## 2025-01-04 ENCOUNTER — PATIENT MESSAGE (OUTPATIENT)
Dept: ADMINISTRATIVE | Facility: HOSPITAL | Age: 60
End: 2025-01-04
Payer: COMMERCIAL

## 2025-01-05 DIAGNOSIS — Z12.11 SCREENING FOR COLON CANCER: ICD-10-CM

## 2025-01-06 ENCOUNTER — PATIENT MESSAGE (OUTPATIENT)
Dept: PRIMARY CARE CLINIC | Facility: CLINIC | Age: 60
End: 2025-01-06
Payer: COMMERCIAL

## 2025-02-23 DIAGNOSIS — E66.01 OBESITY, MORBID, BMI 40.0-49.9: ICD-10-CM

## 2025-02-23 DIAGNOSIS — M48.02 FORAMINAL STENOSIS OF CERVICAL REGION: ICD-10-CM

## 2025-02-24 RX ORDER — GABAPENTIN 300 MG/1
CAPSULE ORAL
Qty: 90 CAPSULE | Refills: 5 | Status: SHIPPED | OUTPATIENT
Start: 2025-02-24 | End: 2025-08-23

## 2025-05-27 ENCOUNTER — OFFICE VISIT (OUTPATIENT)
Dept: PRIMARY CARE CLINIC | Facility: CLINIC | Age: 60
End: 2025-05-27
Payer: COMMERCIAL

## 2025-05-27 VITALS
BODY MASS INDEX: 39.25 KG/M2 | HEART RATE: 57 BPM | WEIGHT: 244.25 LBS | HEIGHT: 66 IN | SYSTOLIC BLOOD PRESSURE: 130 MMHG | DIASTOLIC BLOOD PRESSURE: 82 MMHG | OXYGEN SATURATION: 99 %

## 2025-05-27 DIAGNOSIS — M25.512 CHRONIC LEFT SHOULDER PAIN: Primary | ICD-10-CM

## 2025-05-27 DIAGNOSIS — R73.03 PREDIABETES: ICD-10-CM

## 2025-05-27 DIAGNOSIS — M48.02 FORAMINAL STENOSIS OF CERVICAL REGION: ICD-10-CM

## 2025-05-27 DIAGNOSIS — G89.29 CHRONIC LEFT SHOULDER PAIN: Primary | ICD-10-CM

## 2025-05-27 DIAGNOSIS — Z79.899 OTHER LONG TERM (CURRENT) DRUG THERAPY: ICD-10-CM

## 2025-05-27 DIAGNOSIS — E66.01 OBESITY, MORBID, BMI 40.0-49.9: ICD-10-CM

## 2025-05-27 DIAGNOSIS — Z00.00 ANNUAL PHYSICAL EXAM: ICD-10-CM

## 2025-05-27 PROCEDURE — 99999 PR PBB SHADOW E&M-EST. PATIENT-LVL III: CPT | Mod: PBBFAC,,, | Performed by: STUDENT IN AN ORGANIZED HEALTH CARE EDUCATION/TRAINING PROGRAM

## 2025-05-27 RX ORDER — TRAMADOL HYDROCHLORIDE 50 MG/1
50 TABLET, FILM COATED ORAL NIGHTLY PRN
Qty: 30 TABLET | Refills: 5 | Status: SHIPPED | OUTPATIENT
Start: 2025-05-27

## 2025-05-27 RX ORDER — MELOXICAM 15 MG/1
15 TABLET ORAL DAILY PRN
Qty: 30 TABLET | Refills: 11 | Status: SHIPPED | OUTPATIENT
Start: 2025-06-20

## 2025-05-27 RX ORDER — GABAPENTIN 300 MG/1
CAPSULE ORAL
Qty: 90 CAPSULE | Refills: 11 | Status: SHIPPED | OUTPATIENT
Start: 2025-05-27 | End: 2025-11-23

## 2025-05-27 NOTE — PROGRESS NOTES
Assessment:       1. Chronic left shoulder pain    2. Prediabetes    3. Obesity, morbid, BMI 40.0-49.9    4. Annual physical exam    5. Other long term (current) drug therapy    6. Foraminal stenosis of cervical region           Plan:     Assessment & Plan    E66.01 Obesity, morbid, BMI 40.0-49.9  M25.512, G89.29 Chronic left shoulder pain  R73.03 Prediabetes  Z00.00 Annual physical exam  Z79.899 Other long term (current) drug therapy  M48.02 Foraminal stenosis of cervical region    PLAN SUMMARY:  - Consider referral to neurosurgery or Dr. Cancino for epidural steroid injection (BERENICE) if shoulder injections become ineffective  - Continue gabapentin, with refill scheduled for early June  - Continue statin medication  - Continue tramadol for as-needed use  - Continue meloxicam, with refill dated 1 month from the 21st  - Order fasting labs, including cholesterol check  - Refer to Dr. Shikha Lewis for potential shoulder injection    M25.512, G89.29 CHRONIC LEFT SHOULDER PAIN:  - Reviewed shoulder pain, noting mild arthritis in shoulder joint.  - Previous subacromial bursa injections provided 3.5-4 months of relief.  - Referred to Dr. Shikha Lewis for potential shoulder injection.  - Consider referral to neurosurgery or Dr. Cancino for epidural steroid injection (BERENICE) if shoulder injections become ineffective.  - Continued tramadol for as-needed use.  - Continued meloxicam, with refill dated 1 month from the 21st.  - Continued gabapentin, with refill scheduled for early June.    R73.03 PREDIABETES:  - A1C of 5.7 (prediabetic range) and satisfactory lipid panel.  - Ordered fasting labs, including cholesterol check.    Z00.00 ANNUAL PHYSICAL EXAM:  - Ordered fasting labs, including cholesterol check.    Z79.899 OTHER LONG TERM (CURRENT) DRUG THERAPY:  - Continued tramadol for as-needed use.  - Continued meloxicam, with refill dated 1 month from the 21st.  - Continued gabapentin, with refill scheduled for early  June.  - Continued statin medication.    M48.02 FORAMINAL STENOSIS OF CERVICAL REGION:  - Reviewed shoulder pain, noting cervical spondylosis at C5-6.  - Evaluated option of epidural steroid injection (BERENICE) based on neurosurgeon's recommendation due to EMG findings suggesting nerve impingement.  - Consider referral to neurosurgery or Dr. Cancino for epidural steroid injection (BERENICE) if shoulder injections become ineffective.             Chronic left shoulder pain    Prediabetes  -     CBC Auto Differential; Future; Expected date: 05/27/2025  -     Comprehensive Metabolic Panel; Future; Expected date: 05/27/2025  -     Lipid Panel; Future; Expected date: 05/27/2025  -     Hemoglobin A1C; Future; Expected date: 05/27/2025  -     TSH; Future; Expected date: 05/27/2025    Obesity, morbid, BMI 40.0-49.9  -     gabapentin (NEURONTIN) 300 MG capsule; Take 1 capsule (300 mg total) by mouth once daily AND 2 capsules (600 mg total) every evening.  Dispense: 90 capsule; Refill: 11    Annual physical exam  -     CBC Auto Differential; Future; Expected date: 05/27/2025  -     Comprehensive Metabolic Panel; Future; Expected date: 05/27/2025  -     Lipid Panel; Future; Expected date: 05/27/2025  -     Hemoglobin A1C; Future; Expected date: 05/27/2025  -     TSH; Future; Expected date: 05/27/2025    Other long term (current) drug therapy  -     CBC Auto Differential; Future; Expected date: 05/27/2025  -     Comprehensive Metabolic Panel; Future; Expected date: 05/27/2025  -     Lipid Panel; Future; Expected date: 05/27/2025  -     Hemoglobin A1C; Future; Expected date: 05/27/2025  -     TSH; Future; Expected date: 05/27/2025    Foraminal stenosis of cervical region  -     meloxicam (MOBIC) 15 MG tablet; Take 1 tablet (15 mg total) by mouth daily as needed for Pain.  Dispense: 30 tablet; Refill: 11  -     gabapentin (NEURONTIN) 300 MG capsule; Take 1 capsule (300 mg total) by mouth once daily AND 2 capsules (600 mg total) every  evening.  Dispense: 90 capsule; Refill: 11  -     traMADoL (ULTRAM) 50 mg tablet; Take 1 tablet (50 mg total) by mouth nightly as needed for Pain.  Dispense: 30 tablet; Refill: 5                This note was generated with the assistance of ambient listening technology. Verbal consent was obtained by the patient and accompanying visitor(s) for the recording of patient appointment to facilitate this note. I attest to having reviewed and edited the generated note for accuracy, though some syntax or spelling errors may persist. Please contact the author of this note for any clarification.      Subjective:           Patient ID: Pravin Acevedo   Age:  59 y.o.  Sex: male     Chief Complaint:   Follow-up      History of Present Illness:    Pravin Acevedo is a 59 y.o. male who presents today with a chief complaint of Follow-up  .    Has hx of left shoulder pain, states when sleeping on the shoulder will get pain and needs to sit upright.        History of Present Illness    CHIEF COMPLAINT:  Pravin presents today for left shoulder pain and medication refills.    LEFT SHOULDER PAIN:  He reports left shoulder pain that interferes with sleep, requiring him to sleep in an upright position. He has limited use of the shoulder at work. He has received multiple shoulder injections, with the most recent in November providing relief for 3.5-4 months. Prior injections were administered in May and August. Previous TENS unit treatment provided minimal, short-lasting relief. He reports injections were more effective than TENS unit for pain management.    CURRENT MEDICATIONS:  He received lidocaine and gabapentin three weeks ago. He uses tramadol less than daily, last prescribed in November.    LABS:  A1C was 5.7, in prediabetic range. Blood counts, kidney function, liver function, and lipids were within normal limits.      ROS:  Musculoskeletal: +pain with movement, +nightime pain           Review of Systems   Constitutional:  "Negative.  Negative for fatigue and fever.   HENT:  Negative for sinus pain, sneezing and sore throat.    Eyes: Negative.    Respiratory: Negative.  Negative for cough, shortness of breath and wheezing.    Cardiovascular: Negative.  Negative for chest pain, palpitations and leg swelling.   Gastrointestinal: Negative.  Negative for constipation, diarrhea, nausea and vomiting.   Endocrine: Negative.    Genitourinary: Negative.  Negative for difficulty urinating, frequency and urgency.   Musculoskeletal:  Positive for arthralgias.   Skin: Negative.    Allergic/Immunologic: Negative for food allergies.   Neurological:  Negative for weakness and headaches.   Psychiatric/Behavioral:  Positive for sleep disturbance. The patient is not nervous/anxious.            Objective:        Vitals:    05/27/25 0757   BP: 130/82   BP Location: Right arm   Patient Position: Sitting   Pulse: (!) 57   SpO2: 99%   Weight: 110.8 kg (244 lb 4.3 oz)   Height: 5' 6" (1.676 m)       Body mass index is 39.43 kg/m².      Physical Exam  Constitutional:       Appearance: Normal appearance. He is not toxic-appearing.      Comments: As per BMI.   HENT:      Head: Normocephalic and atraumatic.      Right Ear: External ear normal.      Left Ear: External ear normal.      Nose: No congestion.      Mouth/Throat:      Mouth: Mucous membranes are moist.      Pharynx: Oropharynx is clear.   Eyes:      Extraocular Movements: Extraocular movements intact.      Conjunctiva/sclera: Conjunctivae normal.   Cardiovascular:      Rate and Rhythm: Normal rate and regular rhythm.      Heart sounds: No murmur heard.  Pulmonary:      Effort: Pulmonary effort is normal. No respiratory distress.      Breath sounds: No wheezing.   Abdominal:      General: Bowel sounds are normal.      Palpations: Abdomen is soft.   Musculoskeletal:         General: No swelling.      Cervical back: Normal range of motion.   Skin:     General: Skin is warm.      Capillary Refill: Capillary " "refill takes less than 2 seconds.      Coloration: Skin is not jaundiced.   Neurological:      General: No focal deficit present.      Mental Status: He is alert and oriented to person, place, and time.      Motor: No weakness.   Psychiatric:         Mood and Affect: Mood normal.         Physical Exam    MSK: Shoulder - Left: Left shoulder appears normal. Mild arthritis in left shoulder. Inflammation at the top of the left shoulder.               Past Medical History[1]    Lab Results   Component Value Date     05/10/2024    K 4.1 05/10/2024     05/10/2024    CO2 23 05/10/2024    BUN 11 05/10/2024    CREATININE 0.9 05/10/2024    ANIONGAP 9 05/10/2024     Lab Results   Component Value Date    HGBA1C 5.7 (H) 11/26/2024     No results found for: "BNP", "BNPTRIAGEBLO"    Lab Results   Component Value Date    WBC 4.01 05/27/2025    HGB 14.9 05/27/2025    HGB 13.5 (L) 05/10/2024    HCT 45.1 05/27/2025    HCT 41.0 05/10/2024     05/27/2025     05/10/2024    GRAN 2.0 05/10/2024    GRAN 54.3 05/10/2024     Lab Results   Component Value Date    CHOL 149 05/10/2024    HDL 41 05/10/2024    LDLCALC 80.2 05/10/2024    TRIG 139 05/10/2024        Encounter Medications[2]              [1] No past medical history on file.  [2]   Outpatient Encounter Medications as of 5/27/2025   Medication Sig Dispense Refill    atorvastatin (LIPITOR) 20 MG tablet Take 1 tablet (20 mg total) by mouth once daily. 90 tablet 3    chlorhexidine (PERIDEX) 0.12 % solution SMARTSIG:By Mouth      [DISCONTINUED] gabapentin (NEURONTIN) 300 MG capsule Take 1 capsule (300 mg total) by mouth once daily AND 2 capsules (600 mg total) every evening. 90 capsule 5    [DISCONTINUED] meloxicam (MOBIC) 15 MG tablet Take 1 tablet (15 mg total) by mouth daily as needed for Pain. 30 tablet 5    [DISCONTINUED] traMADoL (ULTRAM) 50 mg tablet Take 1 tablet (50 mg total) by mouth nightly as needed for Pain. 30 tablet 5    gabapentin (NEURONTIN) 300 MG " capsule Take 1 capsule (300 mg total) by mouth once daily AND 2 capsules (600 mg total) every evening. 90 capsule 11    [START ON 6/20/2025] meloxicam (MOBIC) 15 MG tablet Take 1 tablet (15 mg total) by mouth daily as needed for Pain. 30 tablet 11    traMADoL (ULTRAM) 50 mg tablet Take 1 tablet (50 mg total) by mouth nightly as needed for Pain. 30 tablet 5     No facility-administered encounter medications on file as of 5/27/2025.

## 2025-05-30 ENCOUNTER — RESULTS FOLLOW-UP (OUTPATIENT)
Dept: PRIMARY CARE CLINIC | Facility: CLINIC | Age: 60
End: 2025-05-30

## 2025-06-12 ENCOUNTER — OFFICE VISIT (OUTPATIENT)
Dept: ORTHOPEDICS | Facility: CLINIC | Age: 60
End: 2025-06-12
Payer: COMMERCIAL

## 2025-06-12 VITALS
HEIGHT: 66 IN | DIASTOLIC BLOOD PRESSURE: 87 MMHG | SYSTOLIC BLOOD PRESSURE: 145 MMHG | WEIGHT: 244.25 LBS | HEART RATE: 77 BPM | BODY MASS INDEX: 39.25 KG/M2

## 2025-06-12 DIAGNOSIS — G89.29 CHRONIC LEFT SHOULDER PAIN: Primary | ICD-10-CM

## 2025-06-12 DIAGNOSIS — M25.512 CHRONIC LEFT SHOULDER PAIN: Primary | ICD-10-CM

## 2025-06-12 DIAGNOSIS — M67.912 ROTATOR CUFF DYSFUNCTION, LEFT: ICD-10-CM

## 2025-06-12 PROCEDURE — 99999 PR PBB SHADOW E&M-EST. PATIENT-LVL IV: CPT | Mod: PBBFAC,,,

## 2025-06-12 RX ORDER — TRIAMCINOLONE ACETONIDE 40 MG/ML
40 INJECTION, SUSPENSION INTRA-ARTICULAR; INTRAMUSCULAR
Status: DISCONTINUED | OUTPATIENT
Start: 2025-06-12 | End: 2025-06-12 | Stop reason: HOSPADM

## 2025-06-12 RX ORDER — TRIAMCINOLONE ACETONIDE 40 MG/ML
40 INJECTION, SUSPENSION INTRA-ARTICULAR; INTRAMUSCULAR
Status: COMPLETED | OUTPATIENT
Start: 2025-06-12 | End: 2025-06-12

## 2025-06-12 RX ADMIN — TRIAMCINOLONE ACETONIDE 40 MG: 40 INJECTION, SUSPENSION INTRA-ARTICULAR; INTRAMUSCULAR at 03:06

## 2025-06-12 RX ADMIN — TRIAMCINOLONE ACETONIDE 40 MG: 40 INJECTION, SUSPENSION INTRA-ARTICULAR; INTRAMUSCULAR at 02:06

## 2025-06-12 NOTE — PROGRESS NOTES
Patient ID: Pravin Acevedo is a 60 y.o. male    Pain of the Left Shoulder      History of Present Illness:    Pravin Acevedo presents to clinic for left shoulder pain.  He is well known to clinic for this issue.  He has worked on weight loss.  He previously had to left shoulder injections done by myself.  Most recent in November 2024.  States injection helped pain up until the last month or so.  He has previously seen pain management for cervical stenosis.  He has not since followed up for discussion of epidural.  He would like to repeat left shoulder CSI today.  Has not had a left shoulder MRI.    Occupation:      Ambulating:  Unassisted  Diabetic: no  Smoking: no  Hx of DVT/PE: no      PAST MEDICAL HISTORY: No past medical history on file.  PAST SURGICAL HISTORY: No past surgical history on file.  FAMILY HISTORY:   Family History   Problem Relation Name Age of Onset    No Known Problems Mother          passed in [place]    No Known Problems Father      Heart attack Neg Hx      Stroke Neg Hx      Lung cancer Neg Hx      Colon cancer Neg Hx      Prostate cancer Neg Hx       SOCIAL HISTORY:   Social History     Occupational History    Not on file   Tobacco Use    Smoking status: Never    Smokeless tobacco: Never   Substance and Sexual Activity    Alcohol use: Not Currently     Alcohol/week: 6.0 standard drinks of alcohol     Types: 6 Cans of beer per week     Comment: 6 beer per weekend    Drug use: Never    Sexual activity: Yes     Partners: Female        MEDICATIONS:   Current Outpatient Medications:     atorvastatin (LIPITOR) 20 MG tablet, Take 1 tablet (20 mg total) by mouth once daily., Disp: 90 tablet, Rfl: 3    chlorhexidine (PERIDEX) 0.12 % solution, SMARTSIG:By Mouth, Disp: , Rfl:     gabapentin (NEURONTIN) 300 MG capsule, Take 1 capsule (300 mg total) by mouth once daily AND 2 capsules (600 mg total) every evening., Disp: 90 capsule, Rfl: 11    [START ON 6/20/2025] meloxicam (MOBIC)  15 MG tablet, Take 1 tablet (15 mg total) by mouth daily as needed for Pain., Disp: 30 tablet, Rfl: 11    traMADoL (ULTRAM) 50 mg tablet, Take 1 tablet (50 mg total) by mouth nightly as needed for Pain., Disp: 30 tablet, Rfl: 5  ALLERGIES: Review of patient's allergies indicates:  No Known Allergies      Physical Exam     Vitals:    06/12/25 1338   BP: (!) 145/87   Pulse: 77         Alert and oriented to person, place and time. No acute distress. Well-groomed, not ill appearing. Pupils round and reactive, normal respiratory effort, no audible wheezing.     Shoulder / Upper Extremity Exam    OBSERVATION:     Swelling  none  Deformity  none   Discoloration  none   Scapular winging none   Scars   none  Atrophy  none    TENDERNESS                Clavicle   Negative         AC Jt.    Negative        SC Jt.    Negative          Acromion:  Negative        Scapular Spine +   Supraspinatus  +       Infraspinatus  Negative   LH Biceps   Negative   Greater Tub.  +   Trapezius  Negative   Cervical spine  Negative        ROM: (* = with pain)              FE    170° *      ER at 0°    60°   *     ER at 90° ABD  40° *      IR at 90°  ABD   NA         IR (spine level)   T10         STRENGTH: (* = with pain)    SCAPTION   4/5        IR    4/5       ER    5/5       BICEPS   5/5       Deltoid    5/5         SIGNS:  Painful side       NEER   +   OTONYS  neg    PAGE   +   SPEEDS  neg     DROP ARM   neg   BELLY PRESS neg   Superior escape none    LIFT-OFF  neg   X-Body ADD    neg    MOVING VALGUS neg           Imaging:     Bilateral shoulder X-rays ordered/reviewed by me showing no evidence of fracture or dislocation. There is no obvious malalignment. No evidence of masses, lesions or foreign bodies.  Mild degenerative changes bilaterally.      Assessment & Plan    Chronic left shoulder pain  -     MRI Shoulder Without Contrast Left; Future; Expected date: 06/12/2025    Rotator cuff dysfunction, left  -     MRI Shoulder  Without Contrast Left; Future; Expected date: 06/12/2025      Patient returns for left shoulder pain.  He had good relief with previous injection.  At this point he has had multiple injections with relief.  Would recommend MRI to evaluate any rotator cuff pathology of the left shoulder.  We also discussed his symptoms are likely worsened with his cervical stenosis as well.  Instructed him to continue to follow up with pain management.  Left shoulder injection done today, he will also follow up with the surgeon to discuss MRI results and next steps.    Follow up:  [facility] MRI results  X-rays next visit:  None    All questions were answered and patient is agreeable to the above plan.

## 2025-06-12 NOTE — PROCEDURES
Large Joint Aspiration/Injection: L subacromial bursa    Date/Time: 6/12/2025 3:00 PM    Performed by: Shikha Lewis PA-C  Authorized by: Shikha Lewis PA-C    Consent Done?:  Yes (Verbal)  Indications:  Pain and arthritis  Site marked: the procedure site was marked    Timeout: prior to procedure the correct patient, procedure, and site was verified    Prep: patient was prepped and draped in usual sterile fashion    Local anesthesia used?: No    Local anesthetic:  Topical anesthetic  Anesthetic total (ml):  4      Details:  Needle Size:  21 G  Ultrasonic Guidance for needle placement?: No    Approach:  Posterior  Location:  Shoulder  Site:  L subacromial bursa  Medications:  40 mg triamcinolone acetonide 40 mg/mL  Patient tolerance:  Patient tolerated the procedure well with no immediate complications

## 2025-06-19 ENCOUNTER — TELEPHONE (OUTPATIENT)
Dept: ORTHOPEDICS | Facility: CLINIC | Age: 60
End: 2025-06-19
Payer: COMMERCIAL

## 2025-06-19 DIAGNOSIS — M25.512 CHRONIC LEFT SHOULDER PAIN: Primary | ICD-10-CM

## 2025-06-19 DIAGNOSIS — G89.29 CHRONIC LEFT SHOULDER PAIN: Primary | ICD-10-CM

## 2025-06-19 NOTE — TELEPHONE ENCOUNTER
Copied from CRM #[id number]. Topic: Appointments - Appointment Rescheduling  >> Jun 18, 2025  4:07 PM Leonardo wrote:  Reschedule Existing Appointment     Current appt date: 6/18     Type of appt : MRI     Physician: Dr. Higgins     Reason for rescheduling: MRI was approved right before his appt but he needs to reschedule because he is already late for the appt     Caller: Pravin     Contact Preference:  [phone]

## 2025-07-16 ENCOUNTER — OFFICE VISIT (OUTPATIENT)
Dept: ORTHOPEDICS | Facility: CLINIC | Age: 60
End: 2025-07-16
Payer: COMMERCIAL

## 2025-07-16 VITALS
HEART RATE: 86 BPM | DIASTOLIC BLOOD PRESSURE: 100 MMHG | WEIGHT: 245.56 LBS | RESPIRATION RATE: 18 BRPM | BODY MASS INDEX: 39.64 KG/M2 | SYSTOLIC BLOOD PRESSURE: 156 MMHG

## 2025-07-16 DIAGNOSIS — M67.814 TENDINOSIS OF LEFT ROTATOR CUFF: ICD-10-CM

## 2025-07-16 DIAGNOSIS — M19.012 ARTHRITIS OF LEFT ACROMIOCLAVICULAR JOINT: ICD-10-CM

## 2025-07-16 DIAGNOSIS — M65.912: Primary | ICD-10-CM

## 2025-07-16 PROCEDURE — 99213 OFFICE O/P EST LOW 20 MIN: CPT | Mod: S$GLB,,,

## 2025-07-16 PROCEDURE — 3080F DIAST BP >= 90 MM HG: CPT | Mod: CPTII,S$GLB,,

## 2025-07-16 PROCEDURE — 3044F HG A1C LEVEL LT 7.0%: CPT | Mod: CPTII,S$GLB,,

## 2025-07-16 PROCEDURE — 1160F RVW MEDS BY RX/DR IN RCRD: CPT | Mod: CPTII,S$GLB,,

## 2025-07-16 PROCEDURE — 1159F MED LIST DOCD IN RCRD: CPT | Mod: CPTII,S$GLB,,

## 2025-07-16 PROCEDURE — 3077F SYST BP >= 140 MM HG: CPT | Mod: CPTII,S$GLB,,

## 2025-07-16 PROCEDURE — 99999 PR PBB SHADOW E&M-EST. PATIENT-LVL III: CPT | Mod: PBBFAC,,,

## 2025-07-16 PROCEDURE — 3008F BODY MASS INDEX DOCD: CPT | Mod: CPTII,S$GLB,,

## 2025-07-16 NOTE — PROGRESS NOTES
Patient ID: Pravin Acevedo is a 60 y.o. male    No chief complaint on file.      History of Present Illness:    Pravin Acevedo presents to clinic for left shoulder pain.  He is well known to clinic for this issue.  He has worked on weight loss.  He previously had to left shoulder injections done by myself.  Most recent in November 2024.  States injection helped pain up until the last month or so.  He has previously seen pain management for cervical stenosis.  He has not since followed up for discussion of epidural.  He would like to repeat left shoulder CSI today.  Has not had a left shoulder MRI.    Occupation:      Ambulating:  Unassisted  Diabetic: no  Smoking: no  Hx of DVT/PE: no    ____________________________________________________________________    Interval history 7/16/2025 : Patient returns today for follow up of left shoulder pain. Here to discuss MRI results.        PAST MEDICAL HISTORY: History reviewed. No pertinent past medical history.  PAST SURGICAL HISTORY: History reviewed. No pertinent surgical history.  FAMILY HISTORY:   Family History   Problem Relation Name Age of Onset    No Known Problems Mother          passed in [place]    No Known Problems Father      Heart attack Neg Hx      Stroke Neg Hx      Lung cancer Neg Hx      Colon cancer Neg Hx      Prostate cancer Neg Hx       SOCIAL HISTORY:   Social History     Occupational History    Not on file   Tobacco Use    Smoking status: Never    Smokeless tobacco: Never   Substance and Sexual Activity    Alcohol use: Not Currently     Alcohol/week: 6.0 standard drinks of alcohol     Types: 6 Cans of beer per week     Comment: 6 beer per weekend    Drug use: Never    Sexual activity: Yes     Partners: Female        MEDICATIONS:   Current Outpatient Medications:     atorvastatin (LIPITOR) 20 MG tablet, Take 1 tablet (20 mg total) by mouth once daily., Disp: 90 tablet, Rfl: 3    chlorhexidine (PERIDEX) 0.12 % solution,  SMARTSIG:By Mouth, Disp: , Rfl:     gabapentin (NEURONTIN) 300 MG capsule, Take 1 capsule (300 mg total) by mouth once daily AND 2 capsules (600 mg total) every evening., Disp: 90 capsule, Rfl: 11    meloxicam (MOBIC) 15 MG tablet, Take 1 tablet (15 mg total) by mouth daily as needed for Pain., Disp: 30 tablet, Rfl: 11    traMADoL (ULTRAM) 50 mg tablet, Take 1 tablet (50 mg total) by mouth nightly as needed for Pain., Disp: 30 tablet, Rfl: 5  ALLERGIES: Review of patient's allergies indicates:  No Known Allergies      Physical Exam     Vitals:    07/16/25 0928   BP: (!) 156/100   Pulse: 86   Resp: 18           Alert and oriented to person, place and time. No acute distress. Well-groomed, not ill appearing. Pupils round and reactive, normal respiratory effort, no audible wheezing.     Shoulder / Upper Extremity Exam    OBSERVATION:     Swelling  none  Deformity  none   Discoloration  none   Scapular winging none   Scars   none  Atrophy  none    TENDERNESS                Clavicle   Negative         AC Jt.    Negative        SC Jt.    Negative          Acromion:  Negative        Scapular Spine +   Supraspinatus  +       Infraspinatus  Negative   LH Biceps   Negative   Greater Tub.  +   Trapezius  Negative   Cervical spine  Negative        ROM: (* = with pain)              FE    170° *      ER at 0°    60°   *     ER at 90° ABD  40° *      IR at 90°  ABD   NA         IR (spine level)   T10         STRENGTH: (* = with pain)    SCAPTION   4/5        IR    4/5       ER    5/5       BICEPS   5/5       Deltoid    5/5         SIGNS:  Painful side       NEER   +   OTONYS  neg    PAGE   +   SPEEDS  neg     DROP ARM   neg   BELLY PRESS neg   Superior escape none    LIFT-OFF  neg   X-Body ADD    neg    MOVING VALGUS neg           Imaging:     Bilateral shoulder X-rays ordered/reviewed by me showing no evidence of fracture or dislocation. There is no obvious malalignment. No evidence of masses, lesions or foreign  bodies.  Mild degenerative changes bilaterally.    MRI left shoulder:   1. Supraspinatus tendinosis with a small, low-grade partial-thickness intrasubstance/concealed footprint tear.  2. Infraspinatus tendinosis.  3. Degenerative fraying/tearing of the superior labrum.  4. Rotator interval synovitis.  5. AC joint arthrosis and subacromial spurring.  6. Subacromial/subdeltoid bursitis.      Assessment & Plan    Supraspinatus tenosynovitis, left  -     Ambulatory referral/consult to Sports Medicine; Future; Expected date: 07/23/2025    Tendinosis of left rotator cuff  -     Ambulatory referral/consult to Sports Medicine; Future; Expected date: 07/23/2025    Arthritis of left acromioclavicular joint  -     Ambulatory referral/consult to Sports Medicine; Future; Expected date: 07/23/2025        Patient here for left shoulder pain.  We discussed his MRI results in detail.  We discussed treatment options including continued injections.  Operatively we discussed left shoulder arthroscopy with rotator cuff repair.  We also discussed Tenex procedure in detail.  He would like to try and avoid surgery at this time and is interested in undergoing Tenex.  Referral to sports Medicine.  Discussed if he fails Tenex next step would be shoulder arthroscopy.    Referral to sports Medicine to discuss left shoulder Tenex procedure    Follow up: sports med Tenex  X-rays next visit:  None    All questions were answered and patient is agreeable to the above plan.

## 2025-07-21 DIAGNOSIS — R74.02 ELEVATED LDH: ICD-10-CM

## 2025-07-21 DIAGNOSIS — E66.01 OBESITY, MORBID, BMI 40.0-49.9: ICD-10-CM

## 2025-07-21 DIAGNOSIS — M48.02 FORAMINAL STENOSIS OF CERVICAL REGION: ICD-10-CM

## 2025-07-21 NOTE — TELEPHONE ENCOUNTER
No care due was identified.  [facility] Embedded Care Due Messages. Reference number: [id number].   7/21/2025 3:48:45 PM CDT

## 2025-07-22 RX ORDER — MELOXICAM 15 MG/1
15 TABLET ORAL DAILY PRN
Qty: 30 TABLET | Refills: 11 | OUTPATIENT
Start: 2025-07-22

## 2025-07-22 RX ORDER — TRAMADOL HYDROCHLORIDE 50 MG/1
50 TABLET, FILM COATED ORAL NIGHTLY PRN
Qty: 30 TABLET | Refills: 5 | OUTPATIENT
Start: 2025-07-22

## 2025-07-22 RX ORDER — ATORVASTATIN CALCIUM 20 MG/1
20 TABLET, FILM COATED ORAL DAILY
Qty: 90 TABLET | Refills: 3 | Status: SHIPPED | OUTPATIENT
Start: 2025-07-22 | End: 2026-07-22

## 2025-07-22 NOTE — TELEPHONE ENCOUNTER
Refill Routing Note   Medication(s) are not appropriate for processing by [facility] for the following reason(s):        Outside of protocol    [facility] action(s):  Route  Approve               Appointments  past 12m or future 3m with PCP    Date Provider   Last Visit   5/27/2025 Clayton Esparza MD   Next Visit   9/29/2025 Clayton Esparza MD   ED visits in past 90 days: 0        Note composed:10:23 AM 07/22/2025

## 2025-08-14 ENCOUNTER — OFFICE VISIT (OUTPATIENT)
Dept: SPORTS MEDICINE | Facility: CLINIC | Age: 60
End: 2025-08-14
Payer: COMMERCIAL

## 2025-08-14 VITALS
WEIGHT: 245.56 LBS | HEIGHT: 66 IN | BODY MASS INDEX: 39.46 KG/M2 | DIASTOLIC BLOOD PRESSURE: 81 MMHG | SYSTOLIC BLOOD PRESSURE: 182 MMHG

## 2025-08-14 DIAGNOSIS — M65.912: ICD-10-CM

## 2025-08-14 DIAGNOSIS — M67.814 TENDINOSIS OF LEFT ROTATOR CUFF: ICD-10-CM

## 2025-08-14 DIAGNOSIS — G89.29 CHRONIC LEFT SHOULDER PAIN: Primary | ICD-10-CM

## 2025-08-14 DIAGNOSIS — M25.512 CHRONIC LEFT SHOULDER PAIN: Primary | ICD-10-CM

## 2025-08-14 PROCEDURE — 3008F BODY MASS INDEX DOCD: CPT | Mod: CPTII,S$GLB,, | Performed by: ORTHOPAEDIC SURGERY

## 2025-08-14 PROCEDURE — 3079F DIAST BP 80-89 MM HG: CPT | Mod: CPTII,S$GLB,, | Performed by: ORTHOPAEDIC SURGERY

## 2025-08-14 PROCEDURE — 99204 OFFICE O/P NEW MOD 45 MIN: CPT | Mod: S$GLB,,, | Performed by: ORTHOPAEDIC SURGERY

## 2025-08-14 PROCEDURE — 3044F HG A1C LEVEL LT 7.0%: CPT | Mod: CPTII,S$GLB,, | Performed by: ORTHOPAEDIC SURGERY

## 2025-08-14 PROCEDURE — 1160F RVW MEDS BY RX/DR IN RCRD: CPT | Mod: CPTII,S$GLB,, | Performed by: ORTHOPAEDIC SURGERY

## 2025-08-14 PROCEDURE — 1159F MED LIST DOCD IN RCRD: CPT | Mod: CPTII,S$GLB,, | Performed by: ORTHOPAEDIC SURGERY

## 2025-08-14 PROCEDURE — 99999 PR PBB SHADOW E&M-EST. PATIENT-LVL III: CPT | Mod: PBBFAC,,, | Performed by: ORTHOPAEDIC SURGERY

## 2025-08-14 PROCEDURE — 3077F SYST BP >= 140 MM HG: CPT | Mod: CPTII,S$GLB,, | Performed by: ORTHOPAEDIC SURGERY

## 2025-08-14 RX ORDER — METHYLPREDNISOLONE 4 MG/1
TABLET ORAL
Qty: 21 EACH | Refills: 0 | Status: SHIPPED | OUTPATIENT
Start: 2025-08-14